# Patient Record
Sex: FEMALE | Race: WHITE | Employment: FULL TIME | URBAN - METROPOLITAN AREA
[De-identification: names, ages, dates, MRNs, and addresses within clinical notes are randomized per-mention and may not be internally consistent; named-entity substitution may affect disease eponyms.]

---

## 2018-10-31 ENCOUNTER — OFFICE VISIT (OUTPATIENT)
Dept: INFECTIOUS DISEASES | Age: 23
End: 2018-10-31
Payer: COMMERCIAL

## 2018-10-31 VITALS
OXYGEN SATURATION: 99 % | WEIGHT: 231 LBS | HEART RATE: 87 BPM | BODY MASS INDEX: 38.49 KG/M2 | HEIGHT: 65 IN | SYSTOLIC BLOOD PRESSURE: 130 MMHG | DIASTOLIC BLOOD PRESSURE: 80 MMHG

## 2018-10-31 DIAGNOSIS — B00.9 HERPES SIMPLEX TYPE 2 INFECTION: ICD-10-CM

## 2018-10-31 DIAGNOSIS — A53.9 SYPHILIS: ICD-10-CM

## 2018-10-31 PROCEDURE — 99204 OFFICE O/P NEW MOD 45 MIN: CPT | Performed by: INTERNAL MEDICINE

## 2018-10-31 RX ORDER — MEDROXYPROGESTERONE ACETATE 10 MG/1
TABLET ORAL
COMMUNITY
Start: 2018-10-19 | End: 2020-06-22 | Stop reason: ALTCHOICE

## 2018-10-31 ASSESSMENT — ENCOUNTER SYMPTOMS
ALLERGIC/IMMUNOLOGIC NEGATIVE: 1
GASTROINTESTINAL NEGATIVE: 1
RESPIRATORY NEGATIVE: 1
EYES NEGATIVE: 1

## 2018-11-01 NOTE — PROGRESS NOTES
10/31/2018         Referring Physician: Alda Smiley DO  Primary Care Physician: No primary care provider on file. Impression/Plan:   Diagnosis Orders   1. Syphilis  TREPONEMA PALLIDUM (SYPHILLIS) ANTIBODY BY TP-PA    Penicillin G Benzathine (BICILLIN L-A) [id number] UNIT/4ML SUSP    C.trachomatis N.gonorrhoeae DNA, Urine    C.trachomatis N.gonorrhoeae DNA    Hepatitis B Surface Antibody    Hepatitis B Surface Antigen    HEPATITIS C ANTIBODY   2. Herpes simplex type 2 infection         Discussion:  Syphilis  Late latent syphilis, Brenda has it as well  RPR 1:64  HIV screen negative  Reports receipt of  Benzathine penicillin G at the health department. Partner also treated  Needs Hep B, Hep C, GC screening  Plan for 2 more doses of penicillin G  We will monitor every 6 months through to 18 months    Herpes simplex type 2 infection  HSV-2 positive  No ulcers at present time. Return in about 4 weeks (around 11/28/2018). History: Tye Bustos is a 24 y.o.  female presenting today With a referral for treatment of syphilis. The patient had seen hot ObGyn for irregular menstrual periods. She requested for a full panel of tests, RPR was 1:64. Uncertain if confirmatory test with done. She was full of by the health department, who proceeded to give both she and her partner intramuscularly penicillin injections. The patient states that she has had 12 sexual partners over her lifetime. He has not used condoms regularly. She recalls having a shallow painless ulcer 2 years ago. She cannot recall having the rash. She denies headaches, weakness, blurred vision, chest pain, abdominal pain, change in urinary habits. She cannot recall having the confirmatory test for syphilis. Review of Systems   Constitutional: Negative. HENT: Negative. Eyes: Negative. Respiratory: Negative. Cardiovascular: Negative. Gastrointestinal: Negative. Allergic/Immunologic: Negative.     Neurological: Negative. Hematological: Negative. Psychiatric/Behavioral: Negative. Allergies   Allergen Reactions    Latex Rash    Nickel Hives and Rash       Patient Active Problem List   Diagnosis    Syphilis    Herpes simplex type 2 infection       Current Outpatient Prescriptions   Medication Sig Dispense Refill    Penicillin G Benzathine (BICILLIN L-A) [id number] UNIT/4ML SUSP Inject 4 mLs into the muscle once a week for 2 doses 8 mL 0    medroxyPROGESTERone (PROVERA) 10 MG tablet        No current facility-administered medications for this visit. History reviewed. No pertinent past medical history. Past Surgical History:   Procedure Laterality Date    TONSILLECTOMY         Social History     Social History    Marital status: Single     Spouse name: N/A    Number of children: N/A    Years of education: N/A     Occupational History    Not on file.      Social History Main Topics    Smoking status: Current Every Day Smoker     Packs/day: 1.00    Smokeless tobacco: Never Used    Alcohol use No    Drug use: Yes     Types: Marijuana    Sexual activity: Yes     Partners: Male     Other Topics Concern    Not on file     Social History Narrative    No narrative on file       Family History   Problem Relation Age of Onset    Breast Cancer Paternal Aunt     Breast Cancer Maternal Grandmother     Breast Cancer Paternal Grandmother        Vital Signs:  Vitals:    10/31/18 0902   BP: 130/80   Site: Right Upper Arm   Position: Sitting   Cuff Size: Large Adult   Pulse: 87   SpO2: 99%   Weight: 231 lb (104.8 kg)   Height: 5' 5\" (1.651 m)        Wt Readings from Last 3 Encounters:   10/31/18 231 lb (104.8 kg)   08/21/17 220 lb (99.8 kg)   01/06/17 210 lb (95.3 kg)        Physical Exam:   Gen: alert and NAD  HEENT: sclera clear, pupils equal and reactive, extra ocular muscles intact, oropharynx clear, mucus membranes moist, tympanic membranes clear bilaterally, no cervical lymphadenopathy noted and neck

## 2020-05-13 ENCOUNTER — OFFICE VISIT (OUTPATIENT)
Dept: INFECTIOUS DISEASES | Age: 25
End: 2020-05-13
Payer: COMMERCIAL

## 2020-05-13 VITALS
WEIGHT: 236.4 LBS | SYSTOLIC BLOOD PRESSURE: 138 MMHG | HEART RATE: 95 BPM | TEMPERATURE: 98.3 F | DIASTOLIC BLOOD PRESSURE: 86 MMHG | BODY MASS INDEX: 39.34 KG/M2

## 2020-05-13 PROCEDURE — 99214 OFFICE O/P EST MOD 30 MIN: CPT | Performed by: INTERNAL MEDICINE

## 2020-05-13 ASSESSMENT — ENCOUNTER SYMPTOMS
EYES NEGATIVE: 1
GASTROINTESTINAL NEGATIVE: 1
RESPIRATORY NEGATIVE: 1
ALLERGIC/IMMUNOLOGIC NEGATIVE: 1

## 2020-05-13 NOTE — ASSESSMENT & PLAN NOTE
Not active. Chronic acyclovir (800 mg bid) or valacyclovir suppression are options. She will let me know.

## 2020-05-13 NOTE — ASSESSMENT & PLAN NOTE
RPR 1:8-1:16, signifies cure. 1:128 likely a lab error. Received a dose of penicillin in April 2020. Would monitor RPR. See in 3 months. If elevated re-evaluate for possible LP if clinically indicated.

## 2020-05-13 NOTE — PROGRESS NOTES
Narrative    Not on file       Family History   Problem Relation Age of Onset    Breast Cancer Paternal Aunt     Breast Cancer Maternal Grandmother     Breast Cancer Paternal Grandmother        Vital Signs:  Vitals:    05/13/20 0929   BP: 138/86   Site: Left Upper Arm   Position: Sitting   Cuff Size: Large Adult   Pulse: 95   Temp: 98.3 °F (36.8 °C)   TempSrc: Tympanic   Weight: 236 lb 6.4 oz (107.2 kg)        Wt Readings from Last 3 Encounters:   05/13/20 236 lb 6.4 oz (107.2 kg)   10/31/18 231 lb (104.8 kg)   08/21/17 220 lb (99.8 kg)        Physical Exam:   Gen: alert and NAD  HEENT: sclera clear, pupils equal and reactive, extra ocular muscles intact, oropharynx clear, mucus membranes moist, tympanic membranes clear bilaterally, no cervical lymphadenopathy noted and neck supple  Neck: supple, no significant adenopathy  Chest: clear to auscultation, no wheezes, rales or rhonchi, symmetric air entry  Heart: regular rate and rhythm, no murmurs  ABD: abdomen is soft without significant tenderness, masses, organomegaly or guarding.   EXT:peripheral pulses normal, no pedal edema, no clubbing or cyanosis  NEURO: alert, oriented, normal speech, no focal findings or movement disorder noted  Skin: well hydrated, no lesions, surgical site examined  Wounds: None  Labs:   WBC   Date Value Ref Range Status   08/21/2017 9.9 4.0 - 10.5 K/CU MM Final     CREATININE   Date Value Ref Range Status   08/21/2017 0.9 0.6 - 1.1 MG/DL Final       Cultures:  No results found for: CULTURE    Imaging Studies:

## 2020-06-22 ENCOUNTER — OFFICE VISIT (OUTPATIENT)
Dept: FAMILY MEDICINE CLINIC | Age: 25
End: 2020-06-22
Payer: COMMERCIAL

## 2020-06-22 VITALS
DIASTOLIC BLOOD PRESSURE: 80 MMHG | HEIGHT: 64 IN | RESPIRATION RATE: 16 BRPM | SYSTOLIC BLOOD PRESSURE: 122 MMHG | HEART RATE: 80 BPM | BODY MASS INDEX: 40.49 KG/M2 | OXYGEN SATURATION: 98 % | TEMPERATURE: 97.5 F | WEIGHT: 237.2 LBS

## 2020-06-22 PROBLEM — Z76.89 ENCOUNTER TO ESTABLISH CARE: Status: ACTIVE | Noted: 2020-06-22

## 2020-06-22 PROBLEM — N91.2 AMENORRHEA: Status: ACTIVE | Noted: 2020-06-22

## 2020-06-22 LAB
CONTROL: NORMAL
PREGNANCY TEST URINE, POC: NEGATIVE

## 2020-06-22 PROCEDURE — G8417 CALC BMI ABV UP PARAM F/U: HCPCS | Performed by: NURSE PRACTITIONER

## 2020-06-22 PROCEDURE — 99203 OFFICE O/P NEW LOW 30 MIN: CPT | Performed by: NURSE PRACTITIONER

## 2020-06-22 PROCEDURE — 81025 URINE PREGNANCY TEST: CPT | Performed by: NURSE PRACTITIONER

## 2020-06-22 PROCEDURE — 4004F PT TOBACCO SCREEN RCVD TLK: CPT | Performed by: NURSE PRACTITIONER

## 2020-06-22 PROCEDURE — G8427 DOCREV CUR MEDS BY ELIG CLIN: HCPCS | Performed by: NURSE PRACTITIONER

## 2020-06-22 ASSESSMENT — PATIENT HEALTH QUESTIONNAIRE - PHQ9
1. LITTLE INTEREST OR PLEASURE IN DOING THINGS: 0
SUM OF ALL RESPONSES TO PHQ QUESTIONS 1-9: 0
SUM OF ALL RESPONSES TO PHQ9 QUESTIONS 1 & 2: 0
SUM OF ALL RESPONSES TO PHQ QUESTIONS 1-9: 0
2. FEELING DOWN, DEPRESSED OR HOPELESS: 0

## 2020-06-22 ASSESSMENT — ENCOUNTER SYMPTOMS: RESPIRATORY NEGATIVE: 1

## 2020-06-22 NOTE — PROGRESS NOTES
Objective:      /80 (Site: Right Upper Arm, Position: Sitting, Cuff Size: Medium Adult)   Pulse 80   Temp 97.5 °F (36.4 °C) (Temporal)   Resp 16   Ht 5' 4\" (1.626 m)   Wt 237 lb 3.2 oz (107.6 kg)   LMP 04/30/2020   SpO2 98%   BMI 40.72 kg/m²      Physical Exam  Vitals signs reviewed. Constitutional:       Appearance: Normal appearance. She is obese. She is not ill-appearing. HENT:      Mouth/Throat:      Mouth: Mucous membranes are moist.      Pharynx: Oropharynx is clear. Eyes:      Extraocular Movements: Extraocular movements intact. Conjunctiva/sclera: Conjunctivae normal.      Pupils: Pupils are equal, round, and reactive to light. Neck:      Musculoskeletal: Normal range of motion and neck supple. Thyroid: No thyroid mass, thyromegaly or thyroid tenderness. Trachea: Trachea normal.   Cardiovascular:      Rate and Rhythm: Normal rate and regular rhythm. Heart sounds: Normal heart sounds. Pulmonary:      Effort: Pulmonary effort is normal.      Breath sounds: Normal breath sounds. Abdominal:      General: Bowel sounds are normal.      Palpations: Abdomen is soft. Tenderness: There is no abdominal tenderness. Musculoskeletal: Normal range of motion. Skin:     General: Skin is warm and dry. Capillary Refill: Capillary refill takes less than 2 seconds. Neurological:      General: No focal deficit present. Mental Status: She is alert and oriented to person, place, and time. Deep Tendon Reflexes: Reflexes are normal and symmetric. Psychiatric:         Attention and Perception: Attention normal.         Mood and Affect: Mood normal.         Behavior: Behavior normal.            Assessment / Plan:      1. Encounter to establish care  Welcome to my practice, I look forward to helping you meet your health goals. Please let me know how I can help you. 2. Amenorrhea  Will request records from Dr. Mellisa Loaiza and Dr. Rosa Maria Cardoso.   Recommend that patient

## 2020-06-22 NOTE — LETTER
[facility] [address] [place] [id number]  Phone: [phone]  Fax: [phone]    JEREMY Verdugo CNP        June 22, 2020     Patient: Kim Encarnacion   YOB: 1995   Date of Visit: 6/22/2020       To Whom it May Concern:    Kim Encarnacion was seen in my clinic on 6/22/2020. She may return to work on 06/23/2020. If you have any questions or concerns, please don't hesitate to call.     Sincerely,       JEREMY Verdugo CNP

## 2021-03-10 ENCOUNTER — HOSPITAL ENCOUNTER (OUTPATIENT)
Dept: MRI IMAGING | Age: 26
Discharge: HOME | End: 2021-03-10
Payer: COMMERCIAL

## 2021-03-10 DIAGNOSIS — H46.12 OPTIC NEURITIS, RETROBULBAR (ACUTE), LEFT: ICD-10-CM

## 2021-03-10 PROCEDURE — A9579 GAD-BASE MR CONTRAST NOS,1ML: HCPCS | Performed by: SPECIALIST

## 2021-03-10 PROCEDURE — 6360000004 HC RX CONTRAST MEDICATION: Performed by: SPECIALIST

## 2021-03-10 PROCEDURE — 70543 MRI ORBT/FAC/NCK W/O &W/DYE: CPT

## 2021-03-10 PROCEDURE — 70553 MRI BRAIN STEM W/O & W/DYE: CPT

## 2021-03-10 RX ADMIN — GADOTERIDOL 20 ML: 279.3 INJECTION, SOLUTION INTRAVENOUS at 12:10

## 2021-03-12 ENCOUNTER — HOSPITAL ENCOUNTER (EMERGENCY)
Age: 26
Discharge: HOME | DRG: 082 | End: 2021-03-12
Admitting: PHYSICIAN ASSISTANT
Payer: COMMERCIAL

## 2021-03-12 VITALS
HEART RATE: 79 BPM | HEIGHT: 64 IN | OXYGEN SATURATION: 100 % | WEIGHT: 240 LBS | DIASTOLIC BLOOD PRESSURE: 77 MMHG | TEMPERATURE: 97.8 F | BODY MASS INDEX: 40.97 KG/M2 | SYSTOLIC BLOOD PRESSURE: 122 MMHG | RESPIRATION RATE: 19 BRPM

## 2021-03-12 DIAGNOSIS — H46.9 OPTIC NEURITIS: Primary | ICD-10-CM

## 2021-03-12 PROCEDURE — 99285 EMERGENCY DEPT VISIT HI MDM: CPT

## 2021-03-12 PROCEDURE — 96365 THER/PROPH/DIAG IV INF INIT: CPT

## 2021-03-12 PROCEDURE — 80048 BASIC METABOLIC PNL TOTAL CA: CPT

## 2021-03-12 PROCEDURE — 2580000003 HC RX 258: Performed by: NURSE PRACTITIONER

## 2021-03-12 PROCEDURE — 6360000002 HC RX W HCPCS: Performed by: NURSE PRACTITIONER

## 2021-03-12 RX ADMIN — METHYLPREDNISOLONE SODIUM SUCCINATE 1000 MG: 1 INJECTION, POWDER, LYOPHILIZED, FOR SOLUTION INTRAMUSCULAR; INTRAVENOUS at 22:17

## 2021-03-12 ASSESSMENT — PAIN SCALES - GENERAL: PAINLEVEL_OUTOF10: 2

## 2021-03-12 NOTE — Clinical Note
The patient has decided to leave against medical advice, because \"its the weekend\"  She has normal mental status and adequate capacity to make medical decisions. The patient refuses hospital admission and wants to be discharged. The risks have  been explained to the patient, including worsening illness, chronic pain, permanent disability, and death. The benefits of admission have also been explained, including the availability and proximity of nurses, physicians, monitoring, diagnostic t esting, and treatment. The patient was able to understand and state the risks and benefits of hospital admission. This was witnessed by nurse  and me. She had the opportunity to ask questions about her medical condition. The patient was merna ated to the extent that she would allow, and knows that she may return for care at any time. Follow up has been discussed and arranged with YANIRA.  Sari Lucas

## 2021-03-13 NOTE — PROGRESS NOTES
I have been consulted on this patient this evening from the ER. Outpatient ophthalmology called me to consult regarding the patient  Tells me patient comes to him with eye pain and visual field defect. Outpatient MRI performed and acute optic neuritis is found  This is her second episode in her lifetime of optic neuritis  Patient was told my opthamology to go to the ER this morning around [phone]. Patient refused and demanded oral steroids  Opthamology instructed and educated this was not appropriate treatment. I instructed this was not appropriate treatment, in addition to the need for further work up. Patient refused to come to ER around 1000 this morning as she did not want to stay in the hospital over the weekend. I instructed opthalmology that if she would come in at the time of instructed I may be able to have case management work on outpatient infusion over the weekend. Opthalmology called me back around [phone] and instructed me that patient found neurologist in [place] to give her oral steroids. I then received a page at [phone] stating patient was in the ER. I instructed PA that she would need 3 days of IV steroids and that she would need to stay in the hospital for this as now it would not be possible to set up outpatient home care infusions as this service is difficult to provide over the weekend. I spoke to patient on the phone in the ER and she states she will refuse care and not stay over the weekend as it would be detrimental to her mental health. I educated her on the risk of optic neuritis and vision loss and additionally in regards to a demyelination work up. She continued to refuse. I have instructed her I will place the order for her to receive her first dose of steroids tonight and that I will place additional orders for next two doses and If she left that would be against medical advice.

## 2021-03-13 NOTE — ED NOTES
Pt. reviewed discharge instructions and follow up instructions. s. Pt. verbalizes understanding with no further questions. Pt. ambulatory and not showing any signs of distress. IV removed.        Al Smith RN  03/12/21 2172

## 2021-03-13 NOTE — ED PROVIDER NOTES
Triage Chief Complaint:   Eye Pain and Headache    Nenana:  Today in the ED I had the pleasure of caring for Saurabh Kim who is a 31 y.o. female that presents today to the ED for L sided eye pain and blurred vision. She has been having sx x 1 year but got worse 1 week ago. Seen ophthamologist 2 days ago and had some edema within the eye. Had a MRI, showed new lesion with optic neuritis. She was advised by neurology to come to the ED for evalutaiton. Pt has mild associated headache.      ROS:  REVIEW OF SYSTEMS    At least 10 systems reviewed      All other review of systems are negative  See HPI and nursing notes for additional information       Past Medical History:   Diagnosis Date    Chlamydia     Herpes simplex type 2 (HSV-2) infection affecting pregnancy, antepartum     History of PCOS     Syphilis      Past Surgical History:   Procedure Laterality Date    TONSILLECTOMY      WISDOM TOOTH EXTRACTION       Family History   Problem Relation Age of Onset    Breast Cancer Paternal Aunt     Breast Cancer Paternal Grandmother     No Known Problems Mother     No Known Problems Father     Cancer Paternal Grandfather      Social History     Socioeconomic History    Marital status: Single     Spouse name: Not on file    Number of children: Not on file    Years of education: Not on file    Highest education level: Not on file   Occupational History    Not on file   Social Needs    Financial resource strain: Not on file    Food insecurity     Worry: Not on file     Inability: Not on file    Transportation needs     Medical: Not on file     Non-medical: Not on file   Tobacco Use    Smoking status: Current Every Day Smoker     Packs/day: 1.50     Years: 12.00     Pack years: 18.00    Smokeless tobacco: Never Used   Substance and Sexual Activity    Alcohol use: No    Drug use: Not Currently     Types: Marijuana    Sexual activity: Yes     Partners: Male   Lifestyle    Physical activity     Days per week: Not on file     Minutes per session: Not on file    Stress: Not on file   Relationships    Social connections     Talks on phone: Not on file     Gets together: Not on file     Attends Alevism service: Not on file     Active member of club or organization: Not on file     Attends meetings of clubs or organizations: Not on file     Relationship status: Not on file    Intimate partner violence     Fear of current or ex partner: Not on file     Emotionally abused: Not on file     Physically abused: Not on file     Forced sexual activity: Not on file   Other Topics Concern    Not on file   Social History Narrative    Not on file     Current Facility-Administered Medications   Medication Dose Route Frequency Provider Last Rate Last Admin    methylPREDNISolone sodium (SOLU-MEDROL) 1,000 mg in sodium chloride 0.9 % 250 mL IVPB  1,000 mg Intravenous Daily JenniferJEREMY Herbert -  mL/hr at 03/12/21 2217 1,000 mg at 03/12/21 2217     Current Outpatient Medications   Medication Sig Dispense Refill    VITAMIN D PO Take by mouth       Allergies   Allergen Reactions    Latex Rash    Iodine Swelling    Nickel Hives and Rash       Nursing Notes Reviewed    Physical Exam:  ED Triage Vitals   Enc Vitals Group      BP 03/12/21 2005 (!) 114/59      Pulse 03/12/21 2005 79      Resp 03/12/21 2005 19      Temp 03/12/21 2005 97.9 °F (36.6 °C)      Temp Source 03/12/21 2005 Oral      SpO2 03/12/21 2005 99 %      Weight 03/12/21 2026 240 lb (108.9 kg)      Height 03/12/21 2026 5' 4\" (1.626 m)      Head Circumference --       Peak Flow --       Pain Score --       Pain Loc --       Pain Edu? --       Excl. in [address]? --      General :Patient is awake alert oriented person place and time no acute distress nontoxic appearing  HEENT: Pupils are equally round and reactive to light extraocular motors are intact conjunctivae clear sclerae white there is no injection no icterus. Nose without any rhinorrhea or epistaxis. Lungs: Respiratory rations unlabored. Chest wall: There is no tenderness to palpation over the chest wall or over ribs  Abdomen: Abdomen is nondistended. Musculoskeletal: 5 out of 5 strength in all 4 extremities full flexion extension abduction and adduction supination pronation of all extremities and all digits. No obvious muscle atrophy is noted. No focal muscle deficits are appreciated  Dermatology: Skin is warm and dry there is no obvious abscesses lacerations or lesions noted  Psych: Mentation is grossly normal cognition is grossly normal. Affect is appropriate  . I have reviewed and interpreted all of the currently available lab results from this visit (if applicable):  No results found for this visit on 03/12/21. Radiographs (if obtained):  [] The following radiograph was interpreted by myself in the absence of a radiologist:   [] Radiologist's Report Reviewed:  No orders to display       EKG (if obtained):   Please See Note of attending physician for EKG interpretation.      Chart review shows recent radiograph(s):  Mri Orbits Face Neck W Wo Contrast    Result Date: 3/10/2021  EXAMINATION: MRI OF THE BRAIN WITHOUT AND WITH CONTRAST; MRI OF THE BRAIN AND MRI OF THE ORBITS WITH AND WITHOUT CONTRAST 3/10/2021 12:05 pm TECHNIQUE: Multiplanar multisequence MRI of the head/brain was performed without and with the administration of intravenous contrast.; Multiplanar multisequence MRI of the brain and MRI of the orbits was performed with and without intravenous contrast. COMPARISON: 01/06/2017 HISTORY: ORDERING SYSTEM PROVIDED HISTORY: Optic neuritis, retrobulbar (acute), left TECHNOLOGIST PROVIDED HISTORY: Is the patient pregnant?->No Reason for Exam: pt has optic neuritis lt eye acute hx of rt optic neuritis 20 ml prohance; ORDERING SYSTEM PROVIDED HISTORY: Optic neuritis, retrobulbar (acute), left TECHNOLOGIST PROVIDED HISTORY: Area(s) of interest?->ORBIT Is the patient pregnant?->No Reason for Exam: optic neuritis lt 20 ml prohance FINDINGS: MRI BRAIN: INTRACRANIAL STRUCTURES/VENTRICLES: There are no areas of restricted diffusion to suggest an acute infarct. The cerebral and cerebellar parenchyma demonstrate normal volume for age. There is a focal white matter lesion noted in the posterior left periventricular white matter, radiating perpendicularly from the ependymal surface of the lateral ventricle which may represent a focal area of demyelination given the patient's clinical history. No other areas of abnormal signal are noted in the cerebral or cerebellar parenchyma. There are no abnormal extra-axial fluid collections. The ventricles are normal in size. Normal major intracranial flow voids are noted. There are no areas of abnormal enhancement in the cerebral or cerebellar parenchyma. There are no areas of blooming artifact noted on the gradient echo sequences to suggest sequela of acute or chronic hemorrhage. SINUSES: The paranasal sinuses are well-aerated. The mastoid air cells are well aerated. BONES/SOFT TISSUES: The bone marrow signal intensity is normal in appearance. Craniocervical junction is unremarkable. Pituitary gland is unremarkable. MRI ORBITS: There is an old left lamina papyracea fracture. The globes are normal in appearance. There is enhancement and edema along the left optic nerve sheath complex, compatible with optic neuritis. The extraocular muscles are unremarkable. The enhancement extends along the left optic canal.  The optic chiasm is unremarkable. Infundibulum is midline. The cavernous sinuses are unremarkable. 1. Findings compatible with left optic neuritis. 2. Focal white matter lesion in the posterior left periventricular white matter, compatible with demyelinating disease given the patient's clinical history. No areas of abnormal enhancement intracranially.      Mri Brain W Wo Contrast    Result Date: 3/10/2021  EXAMINATION: MRI OF THE BRAIN WITHOUT AND WITH CONTRAST; MRI OF THE BRAIN AND MRI OF THE ORBITS WITH AND WITHOUT CONTRAST 3/10/2021 12:05 pm TECHNIQUE: Multiplanar multisequence MRI of the head/brain was performed without and with the administration of intravenous contrast.; Multiplanar multisequence MRI of the brain and MRI of the orbits was performed with and without intravenous contrast. COMPARISON: 01/06/2017 HISTORY: ORDERING SYSTEM PROVIDED HISTORY: Optic neuritis, retrobulbar (acute), left TECHNOLOGIST PROVIDED HISTORY: Is the patient pregnant?->No Reason for Exam: pt has optic neuritis lt eye acute hx of rt optic neuritis 20 ml prohance; ORDERING SYSTEM PROVIDED HISTORY: Optic neuritis, retrobulbar (acute), left TECHNOLOGIST PROVIDED HISTORY: Area(s) of interest?->ORBIT Is the patient pregnant?->No Reason for Exam: optic neuritis lt 20 ml prohance FINDINGS: MRI BRAIN: INTRACRANIAL STRUCTURES/VENTRICLES: There are no areas of restricted diffusion to suggest an acute infarct. The cerebral and cerebellar parenchyma demonstrate normal volume for age. There is a focal white matter lesion noted in the posterior left periventricular white matter, radiating perpendicularly from the ependymal surface of the lateral ventricle which may represent a focal area of demyelination given the patient's clinical history. No other areas of abnormal signal are noted in the cerebral or cerebellar parenchyma. There are no abnormal extra-axial fluid collections. The ventricles are normal in size. Normal major intracranial flow voids are noted. There are no areas of abnormal enhancement in the cerebral or cerebellar parenchyma. There are no areas of blooming artifact noted on the gradient echo sequences to suggest sequela of acute or chronic hemorrhage. SINUSES: The paranasal sinuses are well-aerated. The mastoid air cells are well aerated. BONES/SOFT TISSUES: The bone marrow signal intensity is normal in appearance. Craniocervical junction is unremarkable.  Pituitary gland is unremarkable. MRI ORBITS: There is an old left lamina papyracea fracture. The globes are normal in appearance. There is enhancement and edema along the left optic nerve sheath complex, compatible with optic neuritis. The extraocular muscles are unremarkable. The enhancement extends along the left optic canal.  The optic chiasm is unremarkable. Infundibulum is midline. The cavernous sinuses are unremarkable. 1. Findings compatible with left optic neuritis. 2. Focal white matter lesion in the posterior left periventricular white matter, compatible with demyelinating disease given the patient's clinical history. No areas of abnormal enhancement intracranially. MDM:     Interventions given this visit:   Orders Placed This Encounter   Medications    methylPREDNISolone sodium (SOLU-MEDROL) 1,000 mg in sodium chloride 0.9 % 250 mL IVPB       Patient presents today to the emergency department. For IV infusion of steroids after being diagnosed with optic neuritis. Recommendation of both specialist ophthalmologist and neurologist is that patient be  AGAINST MEDICAL ADVICE. admitted to the hospital for IV steroid infusion. However patient believes Radha Sotelo is ridiculous\" because it is the weekend and she does not want to stay in the hospital over the weekend. I did advise patient that if both of her specialists believe that she will require immediate intravenous medications in order to potentially preserve her site that that would be my recommendation however once again she refuses. Jamari Peña the neurological [facility] did speak the patient over the phone. However patient persistently refuses admission to the hospital.  Will provide single dose of IV steroids here in the ED. And discharge patient is educated on outpatient follow-up        I independently managed patient today in the ED.       /66   Pulse 83   Temp 97.9 °F (36.6 °C) (Oral)   Resp 18   Ht 5' 4\" (1.626 m)   Wt 240 lb (108.9 kg) SpO2 96%   BMI 41.20 kg/m²       Clinical Impression:  1. Optic neuritis        Disposition referral (if applicable):  Sacha Shin      as needed    Disposition medications (if applicable):  New Prescriptions    No medications on file         Comment: Please note this report has been produced using speech recognition software and may contain errors related to that system including errors in grammar, punctuation, and spelling, as well as words and phrases that may be inappropriate. If there are any questions or concerns please feel free to contact the dictating provider for clarification.       Harpal Avina, [address] [place] [address]  03/12/21 2523

## 2021-03-13 NOTE — ED TRIAGE NOTES
Dr. Hector Pappas advised/recommended PT to come to Er for steroid infusion and be seen by neurologist on call due to PT new onset of symptoms of blurred vision, eye pain, headache. PT was diagnosed with 'optic-neuritis\"  Today. PT had MRI on Wednesday and Dr. Hector Pappas called this PT today with the results.

## 2021-03-15 ENCOUNTER — HOSPITAL ENCOUNTER (INPATIENT)
Age: 26
LOS: 1 days | Discharge: HOME | DRG: 082 | End: 2021-03-16
Attending: EMERGENCY MEDICINE | Admitting: HOSPITALIST
Payer: COMMERCIAL

## 2021-03-15 DIAGNOSIS — H46.9 OPTIC NEURITIS: Primary | ICD-10-CM

## 2021-03-15 LAB
ALBUMIN SERPL-MCNC: 3.5 GM/DL (ref 3.4–5)
ALP BLD-CCNC: 52 IU/L (ref 40–129)
ALT SERPL-CCNC: 22 U/L (ref 10–40)
ANION GAP SERPL CALCULATED.3IONS-SCNC: 8 MMOL/L (ref 4–16)
AST SERPL-CCNC: 14 IU/L (ref 15–37)
BASOPHILS ABSOLUTE: 0 K/CU MM
BASOPHILS RELATIVE PERCENT: 0.2 % (ref 0–1)
BILIRUB SERPL-MCNC: 0.1 MG/DL (ref 0–1)
BUN BLDV-MCNC: 16 MG/DL (ref 6–23)
CALCIUM SERPL-MCNC: 8.3 MG/DL (ref 8.3–10.6)
CHLORIDE BLD-SCNC: 106 MMOL/L (ref 99–110)
CO2: 23 MMOL/L (ref 21–32)
CREAT SERPL-MCNC: 0.9 MG/DL (ref 0.6–1.1)
DIFFERENTIAL TYPE: ABNORMAL
EOSINOPHILS ABSOLUTE: 0.2 K/CU MM
EOSINOPHILS RELATIVE PERCENT: 2.2 % (ref 0–3)
GFR AFRICAN AMERICAN: >60 ML/MIN/1.73M2
GFR NON-AFRICAN AMERICAN: >60 ML/MIN/1.73M2
GLUCOSE BLD-MCNC: 86 MG/DL (ref 70–99)
HCT VFR BLD CALC: 41.8 % (ref 37–47)
HEMOGLOBIN: 13.9 GM/DL (ref 12.5–16)
IMMATURE NEUTROPHIL %: 0.6 % (ref 0–0.43)
LYMPHOCYTES ABSOLUTE: 4.5 K/CU MM
LYMPHOCYTES RELATIVE PERCENT: 44.2 % (ref 24–44)
MCH RBC QN AUTO: 32.7 PG (ref 27–31)
MCHC RBC AUTO-ENTMCNC: 33.3 % (ref 32–36)
MCV RBC AUTO: 98.4 FL (ref 78–100)
MONOCYTES ABSOLUTE: 0.7 K/CU MM
MONOCYTES RELATIVE PERCENT: 6.5 % (ref 0–4)
NUCLEATED RBC %: 0 %
PDW BLD-RTO: 12.8 % (ref 11.7–14.9)
PLATELET # BLD: 253 K/CU MM (ref 140–440)
PMV BLD AUTO: 9.8 FL (ref 7.5–11.1)
POTASSIUM SERPL-SCNC: 3.9 MMOL/L (ref 3.5–5.1)
RBC # BLD: 4.25 M/CU MM (ref 4.2–5.4)
SEGMENTED NEUTROPHILS ABSOLUTE COUNT: 4.7 K/CU MM
SEGMENTED NEUTROPHILS RELATIVE PERCENT: 46.3 % (ref 36–66)
SODIUM BLD-SCNC: 137 MMOL/L (ref 135–145)
TOTAL IMMATURE NEUTOROPHIL: 0.06 K/CU MM
TOTAL NUCLEATED RBC: 0 K/CU MM
TOTAL PROTEIN: 6.1 GM/DL (ref 6.4–8.2)
WBC # BLD: 10.1 K/CU MM (ref 4–10.5)

## 2021-03-15 PROCEDURE — 80053 COMPREHEN METABOLIC PANEL: CPT

## 2021-03-15 PROCEDURE — 96366 THER/PROPH/DIAG IV INF ADDON: CPT

## 2021-03-15 PROCEDURE — 99284 EMERGENCY DEPT VISIT MOD MDM: CPT

## 2021-03-15 PROCEDURE — 96365 THER/PROPH/DIAG IV INF INIT: CPT

## 2021-03-15 PROCEDURE — 85025 COMPLETE CBC W/AUTO DIFF WBC: CPT

## 2021-03-15 PROCEDURE — 2580000003 HC RX 258: Performed by: PHYSICIAN ASSISTANT

## 2021-03-15 PROCEDURE — 94761 N-INVAS EAR/PLS OXIMETRY MLT: CPT

## 2021-03-15 PROCEDURE — 1200000000 HC SEMI PRIVATE

## 2021-03-15 PROCEDURE — 2580000003 HC RX 258: Performed by: NURSE PRACTITIONER

## 2021-03-15 PROCEDURE — 6360000002 HC RX W HCPCS: Performed by: PHYSICIAN ASSISTANT

## 2021-03-15 RX ORDER — ACETAMINOPHEN 650 MG/1
650 SUPPOSITORY RECTAL EVERY 6 HOURS PRN
Status: DISCONTINUED | OUTPATIENT
Start: 2021-03-15 | End: 2021-03-16 | Stop reason: HOSPADM

## 2021-03-15 RX ORDER — SODIUM CHLORIDE 0.9 % (FLUSH) 0.9 %
10 SYRINGE (ML) INJECTION PRN
Status: DISCONTINUED | OUTPATIENT
Start: 2021-03-15 | End: 2021-03-16 | Stop reason: HOSPADM

## 2021-03-15 RX ORDER — PROMETHAZINE HYDROCHLORIDE 25 MG/1
12.5 TABLET ORAL EVERY 6 HOURS PRN
Status: DISCONTINUED | OUTPATIENT
Start: 2021-03-15 | End: 2021-03-16 | Stop reason: HOSPADM

## 2021-03-15 RX ORDER — POLYETHYLENE GLYCOL 3350 17 G/17G
17 POWDER, FOR SOLUTION ORAL DAILY PRN
Status: DISCONTINUED | OUTPATIENT
Start: 2021-03-15 | End: 2021-03-16 | Stop reason: HOSPADM

## 2021-03-15 RX ORDER — ONDANSETRON 2 MG/ML
4 INJECTION INTRAMUSCULAR; INTRAVENOUS EVERY 6 HOURS PRN
Status: DISCONTINUED | OUTPATIENT
Start: 2021-03-15 | End: 2021-03-16 | Stop reason: HOSPADM

## 2021-03-15 RX ORDER — TRAMADOL HYDROCHLORIDE 50 MG/1
25 TABLET ORAL EVERY 6 HOURS PRN
Status: CANCELLED | OUTPATIENT
Start: 2021-03-15

## 2021-03-15 RX ORDER — NICOTINE 21 MG/24HR
1 PATCH, TRANSDERMAL 24 HOURS TRANSDERMAL DAILY
Status: DISCONTINUED | OUTPATIENT
Start: 2021-03-15 | End: 2021-03-16 | Stop reason: HOSPADM

## 2021-03-15 RX ORDER — SODIUM CHLORIDE 0.9 % (FLUSH) 0.9 %
10 SYRINGE (ML) INJECTION EVERY 12 HOURS SCHEDULED
Status: DISCONTINUED | OUTPATIENT
Start: 2021-03-15 | End: 2021-03-16 | Stop reason: HOSPADM

## 2021-03-15 RX ORDER — ACETAMINOPHEN 325 MG/1
650 TABLET ORAL EVERY 6 HOURS PRN
Status: DISCONTINUED | OUTPATIENT
Start: 2021-03-15 | End: 2021-03-16 | Stop reason: HOSPADM

## 2021-03-15 RX ADMIN — SODIUM CHLORIDE, PRESERVATIVE FREE 10 ML: 5 INJECTION INTRAVENOUS at 20:04

## 2021-03-15 RX ADMIN — SODIUM CHLORIDE 1000 MG: 9 INJECTION, SOLUTION INTRAVENOUS at 10:22

## 2021-03-15 ASSESSMENT — PAIN SCALES - GENERAL: PAINLEVEL_OUTOF10: 0

## 2021-03-15 NOTE — CARE COORDINATION
CM received consult on patient. Kennedi Morales reported that following to CM:     3/12/2021 Patient was in hospital for: Today in the ED I had the pleasure of caring for Paulina Joy who is a 31 y.o. female that presents today to the ED for L sided eye pain and blurred vision. She has been having sx x 1 year but got worse 1 week ago. Seen ophthamologist 2 days ago and had some edema within the eye. Had a MRI, showed new lesion with optic neuritis. She was advised by neurology to come to the ED for evalutaiton. Pt has mild associated headache. Patient was advised that she needed to be admitted, but patient left AMA. 3/15/2021: 31 y.o. female who was brought in by self for chief complaint of optic neuritis steroid infusion. Patient reports that she saw ophthalmologist Dr. Rhiannon Agrawal for vision problems, had MRI performed which diagnosed her with optic neuritis and possible demyelination lateral to the left ventricle. She was sent here for IV antibiotics, was seen on  and had single dose of methylprednisolone 1000 mg given, was recommended for admission but left AGAINST MEDICAL ADVICE. She returns today     Kennedi Morales requested that case management assist with the followin. Patient needs admitted   2. Patient here the other day and left AMA and then came back   3. Patient has [employer] insurance   4. Erick Pyle has talked to [facility] to see patient--ON Call--Neurology, but not today -- will write order--- Erick Pyle spoke to another doctor thats on call for Neurology (Dr. Zach Oakley doctor reported that Problem with [employer] paying for treatment according to On-call doctor for today and on-call doctor reports that he is out of network--but Mary[phone] will see patient one time.)   5. If patient refuses admission:   Case Management needs to:   1. Set up 4 straight days of infusion treatment with our clinic   2. Set up Neurology appointment that ACCEPTS [employer]   3.  Call Patients primary care

## 2021-03-15 NOTE — ED PROVIDER NOTES
Patient Identification  Karime Junior is a 31 y.o. female    Chief Complaint  Other (sent by neurology for steriod infusion )      HPI  (History provided by patient)  This is a 31 y.o. female who was brought in by self for chief complaint of optic neuritis steroid infusion. Patient reports that she saw ophthalmologist Dr. Chaim Hardwick for vision problems, had MRI performed which diagnosed her with optic neuritis and possible demyelination lateral to the left ventricle. She was sent here for IV antibiotics, was seen on March 12 and had single dose of methylprednisolone 1000 mg given, was recommended for admission but left AMA. She returns today for repeat steroid infusion. States vision in left eye has improved but still has blurring, continues to have left eye pain. Denies any current numbness or weakness, states she had some numbness in her right hand and forearm a few weeks ago that resolved. No difficulty speaking or swallowing. No double vision. No dizziness or difficulty with coordination. REVIEW OF SYSTEMS    Constitutional:  Denies fever, chills  HENT:  Denies sore throat or ear pain   Eyes+ vision changes, eye pain  Cardiovascular:  Denies chest pain, syncope  Respiratory:  Denies shortness of breath, cough   GI:  Denies abdominal pain, nausea, vomiting  :  Denies dysuria, discharge  Musculoskeletal:  Denies back pain, joint pain  Skin:  Denies rash, pruritis  Neurologic:  Denies headache, focal weakness, or sensory changes     See HPI and nursing notes for additional information     I have reviewed the following nursing documentation:  Allergies: Allergies   Allergen Reactions    Latex Rash    Iodine Swelling    Nickel Hives and Rash       Past medical history:  has a past medical history of Chlamydia, Herpes simplex type 2 (HSV-2) infection affecting pregnancy, antepartum, History of PCOS, and Syphilis.     Past surgical history:  has a past surgical history that includes Tonsillectomy and [place] tooth extraction. Home medications:   Prior to Admission medications    Medication Sig Start Date End Date Taking? Authorizing Provider   VITAMIN D PO Take by mouth    Historical Provider, MD       Social history:  reports that she has been smoking. She has a 18.00 pack-year smoking history. She has never used smokeless tobacco. She reports previous drug use. Drug: Marijuana. She reports that she does not drink alcohol. Family history:    Family History   Problem Relation Age of Onset    Breast Cancer Paternal Aunt     Breast Cancer Paternal Grandmother     No Known Problems Mother     No Known Problems Father     Cancer Paternal Grandfather          Exam  /78   Pulse 75   Temp 98.5 °F (36.9 °C) (Oral)   Resp 16   Ht 5' 5\" (1.651 m)   Wt 240 lb (108.9 kg)   LMP 02/18/2021   SpO2 98%   BMI 39.94 kg/m²   Nursing note and vitals reviewed. Constitutional: Well developed, well nourished. No acute distress. HENT:      Head: Normocephalic and atraumatic. Ears: External ears normal.      Nose: Nose normal.     Mouth: Membrane mucosa moist and pink. No posterior oropharynx erythema or tonsillar edema  Eyes: Anicteric sclera. No discharge, PERRL  Neck: Supple. Trachea midline. Cardiovascular: RRR, no murmurs, rubs, or gallops, radial pulses 2+ bilaterally. Pulmonary/Chest: Effort normal. No respiratory distress. CTAB. No stridor. No wheezes. No rales. Abdominal: Soft. Nontender to palpation. No distension. No guarding, rebound tenderness, or evidence of ascites. : No CVA tenderness. Musculoskeletal: Moves all extremities. No gross deformity. NEUROLOGICAL: Awake and alert. GCS 15. Cranial nerves 2-12 grossly intact. Strength 5/5 throughout. Light touch sensation intact throughout. Finger to nose WNL. Skin: Warm and dry. No rash. Psychiatric: Normal mood and affect.  Behavior is normal.      Radiographs (if obtained):  [] The following radiograph was interpreted by myself in the absence of a radiologist:   [x] Radiologist's Report Reviewed:  No orders to display      Mri Orbits Face Neck W Wo Contrast    Result Date: 3/10/2021  EXAMINATION: MRI OF THE BRAIN WITHOUT AND WITH CONTRAST; MRI OF THE BRAIN AND MRI OF THE ORBITS WITH AND WITHOUT CONTRAST 3/10/2021 12:05 pm TECHNIQUE: Multiplanar multisequence MRI of the head/brain was performed without and with the administration of intravenous contrast.; Multiplanar multisequence MRI of the brain and MRI of the orbits was performed with and without intravenous contrast. COMPARISON: 01/06/2017 HISTORY: ORDERING SYSTEM PROVIDED HISTORY: Optic neuritis, retrobulbar (acute), left TECHNOLOGIST PROVIDED HISTORY: Is the patient pregnant?->No Reason for Exam: pt has optic neuritis lt eye acute hx of rt optic neuritis 20 ml prohance; ORDERING SYSTEM PROVIDED HISTORY: Optic neuritis, retrobulbar (acute), left TECHNOLOGIST PROVIDED HISTORY: Area(s) of interest?->ORBIT Is the patient pregnant?->No Reason for Exam: optic neuritis lt 20 ml prohance FINDINGS: MRI BRAIN: INTRACRANIAL STRUCTURES/VENTRICLES: There are no areas of restricted diffusion to suggest an acute infarct. The cerebral and cerebellar parenchyma demonstrate normal volume for age. There is a focal white matter lesion noted in the posterior left periventricular white matter, radiating perpendicularly from the ependymal surface of the lateral ventricle which may represent a focal area of demyelination given the patient's clinical history. No other areas of abnormal signal are noted in the cerebral or cerebellar parenchyma. There are no abnormal extra-axial fluid collections. The ventricles are normal in size. Normal major intracranial flow voids are noted. There are no areas of abnormal enhancement in the cerebral or cerebellar parenchyma. There are no areas of blooming artifact noted on the gradient echo sequences to suggest sequela of acute or chronic hemorrhage.  SINUSES: The paranasal sinuses are well-aerated. The mastoid air cells are well aerated. BONES/SOFT TISSUES: The bone marrow signal intensity is normal in appearance. Craniocervical junction is unremarkable. Pituitary gland is unremarkable. MRI ORBITS: There is an old left lamina papyracea fracture. The globes are normal in appearance. There is enhancement and edema along the left optic nerve sheath complex, compatible with optic neuritis. The extraocular muscles are unremarkable. The enhancement extends along the left optic canal.  The optic chiasm is unremarkable. Infundibulum is midline. The cavernous sinuses are unremarkable. 1. Findings compatible with left optic neuritis. 2. Focal white matter lesion in the posterior left periventricular white matter, compatible with demyelinating disease given the patient's clinical history. No areas of abnormal enhancement intracranially. Mri Brain W Wo Contrast    Result Date: 3/10/2021  EXAMINATION: MRI OF THE BRAIN WITHOUT AND WITH CONTRAST; MRI OF THE BRAIN AND MRI OF THE ORBITS WITH AND WITHOUT CONTRAST 3/10/2021 12:05 pm TECHNIQUE: Multiplanar multisequence MRI of the head/brain was performed without and with the administration of intravenous contrast.; Multiplanar multisequence MRI of the brain and MRI of the orbits was performed with and without intravenous contrast. COMPARISON: 01/06/2017 HISTORY: ORDERING SYSTEM PROVIDED HISTORY: Optic neuritis, retrobulbar (acute), left TECHNOLOGIST PROVIDED HISTORY: Is the patient pregnant?->No Reason for Exam: pt has optic neuritis lt eye acute hx of rt optic neuritis 20 ml prohance; ORDERING SYSTEM PROVIDED HISTORY: Optic neuritis, retrobulbar (acute), left TECHNOLOGIST PROVIDED HISTORY: Area(s) of interest?->ORBIT Is the patient pregnant?->No Reason for Exam: optic neuritis lt 20 ml prohance FINDINGS: MRI BRAIN: INTRACRANIAL STRUCTURES/VENTRICLES: There are no areas of restricted diffusion to suggest an acute infarct.   The cerebral and cerebellar parenchyma demonstrate normal volume for age. There is a focal white matter lesion noted in the posterior left periventricular white matter, radiating perpendicularly from the ependymal surface of the lateral ventricle which may represent a focal area of demyelination given the patient's clinical history. No other areas of abnormal signal are noted in the cerebral or cerebellar parenchyma. There are no abnormal extra-axial fluid collections. The ventricles are normal in size. Normal major intracranial flow voids are noted. There are no areas of abnormal enhancement in the cerebral or cerebellar parenchyma. There are no areas of blooming artifact noted on the gradient echo sequences to suggest sequela of acute or chronic hemorrhage. SINUSES: The paranasal sinuses are well-aerated. The mastoid air cells are well aerated. BONES/SOFT TISSUES: The bone marrow signal intensity is normal in appearance. Craniocervical junction is unremarkable. Pituitary gland is unremarkable. MRI ORBITS: There is an old left lamina papyracea fracture. The globes are normal in appearance. There is enhancement and edema along the left optic nerve sheath complex, compatible with optic neuritis. The extraocular muscles are unremarkable. The enhancement extends along the left optic canal.  The optic chiasm is unremarkable. Infundibulum is midline. The cavernous sinuses are unremarkable. 1. Findings compatible with left optic neuritis. 2. Focal white matter lesion in the posterior left periventricular white matter, compatible with demyelinating disease given the patient's clinical history. No areas of abnormal enhancement intracranially.        Labs  Results for orders placed or performed during the hospital encounter of 03/15/21   CBC Auto Differential   Result Value Ref Range    WBC 10.1 4.0 - 10.5 K/CU MM    RBC 4.25 4.2 - 5.4 M/CU MM    Hemoglobin 13.9 12.5 - 16.0 GM/DL    Hematocrit 41.8 37 - 47 %    MCV 98.4 78 - 100 FL    MCH 32.7 (H) 27 - 31 PG    MCHC 33.3 32.0 - 36.0 %    RDW 12.8 11.7 - 14.9 %    Platelets 053 756 - 135 K/CU MM    MPV 9.8 7.5 - 11.1 FL    Differential Type AUTOMATED DIFFERENTIAL     Segs Relative 46.3 36 - 66 %    Lymphocytes % 44.2 (H) 24 - 44 %    Monocytes % 6.5 (H) 0 - 4 %    Eosinophils % 2.2 0 - 3 %    Basophils % 0.2 0 - 1 %    Segs Absolute 4.7 K/CU MM    Lymphocytes Absolute 4.5 K/CU MM    Monocytes Absolute 0.7 K/CU MM    Eosinophils Absolute 0.2 K/CU MM    Basophils Absolute 0.0 K/CU MM    Nucleated RBC % 0.0 %    Total Nucleated RBC 0.0 K/CU MM    Total Immature Neutrophil 0.06 K/CU MM    Immature Neutrophil % 0.6 (H) 0 - 0.43 %   CMP   Result Value Ref Range    Sodium 137 135 - 145 MMOL/L    Potassium 3.9 3.5 - 5.1 MMOL/L    Chloride 106 99 - 110 mMol/L    CO2 23 21 - 32 MMOL/L    BUN 16 6 - 23 MG/DL    CREATININE 0.9 0.6 - 1.1 MG/DL    Glucose 86 70 - 99 MG/DL    Calcium 8.3 8.3 - 10.6 MG/DL    Albumin 3.5 3.4 - 5.0 GM/DL    Total Protein 6.1 (L) 6.4 - 8.2 GM/DL    Total Bilirubin 0.1 0.0 - 1.0 MG/DL    ALT 22 10 - 40 U/L    AST 14 (L) 15 - 37 IU/L    Alkaline Phosphatase 52 40 - 129 IU/L    GFR Non-African American >60 >60 mL/min/1.73m2    GFR African American >60 >60 mL/min/1.73m2    Anion Gap 8 4 - 16         MDM  Patient presents for continued blurred vision, diagnosed with optic neuritis 5 days ago, was recommended to stay here during previous ED stay 3 days ago and declined despite discussion regarding site threatening condition. Patient returned today for more steroids, after extensive discussion with both myself and Dr. Clarke Zelayao she is now agreeable to stay in hospital, I did speak with Dr. Anaya Bound with neurology who recommended 5 days of 1000 mg Solu-Medrol, first dose given in ED. Consult placed to Nimo ALFONSO who agreed to admit to hospitalist service. This patient was also seen and evaluated by Dr. Clarke Mcdowell. Final Impression  1.  Optic neuritis        Blood pressure 124/78, pulse 75, temperature 98.5 °F (36.9 °C), temperature source Oral, resp. rate 16, height 5' 5\" (1.651 m), weight 240 lb (108.9 kg), last menstrual period 02/18/2021, SpO2 98 %. Disposition:  Admit to med/surg floor in stable condition. Patient was given scripts for the following medications. I counseled patient how to take these medications. New Prescriptions    No medications on file       This chart was generated using the [address] [place] [address] dictation system. I created this record but it may contain dictation errors given the limitations of this technology.        Arely Pan PA-C  03/15/21 [id number]

## 2021-03-15 NOTE — H&P
History and Physical  JEREMY rOtiz-BC   Internal Medicine Hospitalist      Name:  Arthuro Severance /Age/Sex: 1995  (31 y.o. female)   MRN & CSN:  [id number] & [id number] Admission Date/Time: 3/15/2021  9:08 AM   Location:  ED28/ED-28 PCP: Jodi Meeks, [address] [facility] Day: 1      Supervising Physician: Dr. Simran Savage     Chief Complaint: Other (sent by neurology for steriod infusion )     Assessment and Plan:   Arthuro Severance is a 31 y.o. morbidly obese female who presents with Optic neuritis, left      Left eye pain/blurred vision secondary to optic neuritis       -was seen in the ED last 21 for Lt eye pain with blurred vision, was diagnosed with optic neuritis       -pt supposed to be admitted for IV infusion of steroids for 5 days last Friday       -but refused to stay and left AMA after receiving a single dose of IV steroids       -Neurology, Dr. Maynor Masterson consulted in ED       -consult ophthalmologist, Dr. Gifty Nagy       -received single dose of methylprednisolone 1000 mg in ED       -cont IV methylprednisolone 1000 mg, daily       -control pain          Tobacco abuse - on Nicotine patch, tobacco cessation education.  Morbid obesity - current BMI       -Health maintenance: exercise, lifestyle modification, and reduced caloric intake            Chronic Illnesses:       -chlamydia       -herpes simplex type II       -syphilis      Current diagnosis and plan of management discussed with the patient and family at the time of admission in lay language who agree to the above plan and disposition of admission for further care. All concerns and questions addressed.       Patient assessment and plan in conjunction with supervising physician - Dr. Marny Bumpers   DVT Prophylaxis [x] Lovenox, []  Heparin, [] SCDs, [] Ambulation  [] Long term AC   GI Prophylaxis [] PPI,  [] H2 Blocker,  [] Carafate,  [x] Diet,  [x] No GI prophylaxis, N/A: patient is not under significant medical stress, non-ICU or is receiving a diet/tube feeds   Code Status  Full CODE   Disposition Patient requires continued admission due to Optic neuritis, left. Discharge Plan: Patient plans to return home upon discharge. MDM [] Low, [x] Moderate,[]  High  Patient's risk as above due to:      [x] One or more chronic illnesses with mild exacerbation progression      [] Two or more stable chronic illnesses      [] Undiagnosed new problem with uncertain prognosis      [] Elective major surgery      []Prescription drug management     History of Present Illness:     Principal Problem: Optic neuritis, left  Saurabh Risk is a 31 y.o. morbidly obese female with past medical history of left eye pain/blurred vision secondary to optic neuritis, chlamydia, herpes simplex type II, syphilis, and tobacco abuse was sent to the ED by her ophthalmologist, Dr. Rachel Kelly with her mother for IV steroid infusion/treatment. Patient reports that she came in to the ED last 03/12/21 for left eye pain with blurred vision and was diagnosed with optic neuritis. Stated that last Friday she received a call from Dr. Rachel Kelly asking her to go to the hospital and get admitted be treated with IV steroids. .She was supposed to be admitted for IV infusion of steroids for 5 days. However, she refused to stay and left AMA after received a single dose of IV steroids. Patient agreed to the plan of care, to get admitted for steroids treatment, family member at bedside. Patient received single dose of methylprednisolone 1000 mg in ED. Patient stated that her vision in left eye has improved but still can feel some blurring sensation and pain. She denies chest pain, fever, cough, SOB or difficulty breathing, headache, paresthesias, abdominal pain, nausea, vomiting, changes in bowels, dysuria, and B/L weakness. Upon interview, the patient provided the history as above. ED Course: Discussed case with ED physician prior to admission.     ROS: Ten point ROS reviewed and negative, unless as noted above per HPI. Objective:   No intake or output data in the 24 hours ending 03/15/21 1351     Vitals:   Vitals:    03/15/21 0855 03/15/21 1309   BP: 138/86 124/78   Pulse: 75    Resp: 16    Temp: 98.5 °F (36.9 °C)    TempSrc: Oral    SpO2: 99% 98%   Weight: 240 lb (108.9 kg)    Height: 5' 5\" (1.651 m)      Physical Exam: 03/15/21    GEN  -Awake, alert, appearing morbidly obese female, sitting upright in bed in no apparent distress, cooperative, able to give adequate history. Appears given age. EYES -Pupils are equally round. No vision changes. No scleral erythema, discharge, or conjunctivitis. HENT -MM are moist. Oral pharynx without exudates, no evidence of thrush. NECK -Supple, no apparent thyromegaly or masses. RESP -LS CTA equal bilat, no wheezes, rales or rhonchi. Symmetric chest movement. No respiratory distress noted. C/V  -S1/S2 auscultated. RRR without appreciable M/R/G. No JVD or carotid bruits. Peripheral pulses equal bilaterally and palpable. Peripheral pulses equal bilaterally and palpable. No peripheral edema. GI  -central obesity, abdomen is soft, round, non-distended, no significant tenderness. No masses or guarding. + BS in all quadrants. Rectal exam deferred.   -Sampson catheter is not present. LYMPH  -No palpable cervical lymphadenopathy and no hepatosplenomegaly. No petechiae or ecchymoses. MS  -B/L extremities strong muscles strength. Full movements. No gross joint deformities. No swelling, intact sensation symmetrical.   SKIN  -Normal coloration, warm, dry. No open wounds or ulcers. NEURO  -CN 2-12 appear grossly intact, normal speech, no lateralizing weakness. PSYC  -Awake, alert, oriented x 4. Anxious.     Past Medical History:      Past Medical History:   Diagnosis Date    Chlamydia     Herpes simplex type 2 (HSV-2) infection affecting pregnancy, antepartum     History of PCOS     Syphilis      Past Surgery History:  Patient has a past surgical history that includes Tonsillectomy and [place] tooth extraction. Social History:    FAM HX: Assessed: family history includes Breast Cancer in her paternal aunt and paternal grandmother; Cancer in her paternal grandfather; No Known Problems in her father and mother. Soc HX:   Social History     Socioeconomic History    Marital status: Single     Spouse name: None    Number of children: None    Years of education: None    Highest education level: None   Occupational History    None   Social Needs    Financial resource strain: None    Food insecurity     Worry: None     Inability: None    Transportation needs     Medical: None     Non-medical: None   Tobacco Use    Smoking status: Current Every Day Smoker     Packs/day: 1.50     Years: 12.00     Pack years: 18.00    Smokeless tobacco: Never Used   Substance and Sexual Activity    Alcohol use: No    Drug use: Not Currently     Types: Marijuana    Sexual activity: Yes     Partners: Male   Lifestyle    Physical activity     Days per week: None     Minutes per session: None    Stress: None   Relationships    Social connections     Talks on phone: None     Gets together: None     Attends Confucianist service: None     Active member of club or organization: None     Attends meetings of clubs or organizations: None     Relationship status: None    Intimate partner violence     Fear of current or ex partner: None     Emotionally abused: None     Physically abused: None     Forced sexual activity: None   Other Topics Concern    None   Social History Narrative    None     TOBACCO:   reports that she has been smoking. She has a 18.00 pack-year smoking history. She has never used smokeless tobacco.  ETOH:   reports no history of alcohol use. Drugs:  reports previous drug use. Drug: Marijuana. Allergies:    Allergies   Allergen Reactions    Latex Rash    Iodine Swelling    Nickel Hives and Rash     Medications:   Medications: Infusions:   PRN Meds:   Prior to Admission Meds:  Prior to Admission medications    Medication Sig Start Date End Date Taking? Authorizing Provider   VITAMIN D PO Take by mouth    Historical Provider, MD     Data:     Laboratory this visit:  Reviewed  Recent Labs     03/15/21  0938   WBC 10.1   HGB 13.9   HCT 41.8         Recent Labs     03/15/21  0938      K 3.9      CO2 23   BUN 16   CREATININE 0.9     Recent Labs     03/15/21  0938   AST 14*   ALT 22   BILITOT 0.1   ALKPHOS 52     No results for input(s): INR in the last 72 hours. No results for input(s): CKTOTAL, CKMB, CKMBINDEX in the last 72 hours. Invalid input(s): Suellen Kimball input(s): PRO-BNP    Radiology this visit:  Reviewed. Mri Orbits Face Neck W Wo Contrast    Result Date: 3/10/2021  EXAMINATION: MRI OF THE BRAIN WITHOUT AND WITH CONTRAST; MRI OF THE BRAIN AND MRI OF THE ORBITS WITH AND WITHOUT CONTRAST 3/10/2021 12:05 pm TECHNIQUE: Multiplanar multisequence MRI of the head/brain was performed without and with the administration of intravenous contrast.; Multiplanar multisequence MRI of the brain and MRI of the orbits was performed with and without intravenous contrast. COMPARISON: 01/06/2017 HISTORY: ORDERING SYSTEM PROVIDED HISTORY: Optic neuritis, retrobulbar (acute), left TECHNOLOGIST PROVIDED HISTORY: Is the patient pregnant?->No Reason for Exam: pt has optic neuritis lt eye acute hx of rt optic neuritis 20 ml prohance; ORDERING SYSTEM PROVIDED HISTORY: Optic neuritis, retrobulbar (acute), left TECHNOLOGIST PROVIDED HISTORY: Area(s) of interest?->ORBIT Is the patient pregnant?->No Reason for Exam: optic neuritis lt 20 ml prohance FINDINGS: MRI BRAIN: INTRACRANIAL STRUCTURES/VENTRICLES: There are no areas of restricted diffusion to suggest an acute infarct. The cerebral and cerebellar parenchyma demonstrate normal volume for age.   There is a focal white matter lesion noted in the posterior left periventricular white matter, radiating perpendicularly from the ependymal surface of the lateral ventricle which may represent a focal area of demyelination given the patient's clinical history. No other areas of abnormal signal are noted in the cerebral or cerebellar parenchyma. There are no abnormal extra-axial fluid collections. The ventricles are normal in size. Normal major intracranial flow voids are noted. There are no areas of abnormal enhancement in the cerebral or cerebellar parenchyma. There are no areas of blooming artifact noted on the gradient echo sequences to suggest sequela of acute or chronic hemorrhage. SINUSES: The paranasal sinuses are well-aerated. The mastoid air cells are well aerated. BONES/SOFT TISSUES: The bone marrow signal intensity is normal in appearance. Craniocervical junction is unremarkable. Pituitary gland is unremarkable. MRI ORBITS: There is an old left lamina papyracea fracture. The globes are normal in appearance. There is enhancement and edema along the left optic nerve sheath complex, compatible with optic neuritis. The extraocular muscles are unremarkable. The enhancement extends along the left optic canal.  The optic chiasm is unremarkable. Infundibulum is midline. The cavernous sinuses are unremarkable. 1. Findings compatible with left optic neuritis. 2. Focal white matter lesion in the posterior left periventricular white matter, compatible with demyelinating disease given the patient's clinical history. No areas of abnormal enhancement intracranially.      Mri Brain W Wo Contrast    Result Date: 3/10/2021  EXAMINATION: MRI OF THE BRAIN WITHOUT AND WITH CONTRAST; MRI OF THE BRAIN AND MRI OF THE ORBITS WITH AND WITHOUT CONTRAST 3/10/2021 12:05 pm TECHNIQUE: Multiplanar multisequence MRI of the head/brain was performed without and with the administration of intravenous contrast.; Multiplanar multisequence MRI of the brain and MRI of the orbits was performed with and without intravenous contrast. COMPARISON: 01/06/2017 HISTORY: ORDERING SYSTEM PROVIDED HISTORY: Optic neuritis, retrobulbar (acute), left TECHNOLOGIST PROVIDED HISTORY: Is the patient pregnant?->No Reason for Exam: pt has optic neuritis lt eye acute hx of rt optic neuritis 20 ml prohance; ORDERING SYSTEM PROVIDED HISTORY: Optic neuritis, retrobulbar (acute), left TECHNOLOGIST PROVIDED HISTORY: Area(s) of interest?->ORBIT Is the patient pregnant?->No Reason for Exam: optic neuritis lt 20 ml prohance FINDINGS: MRI BRAIN: INTRACRANIAL STRUCTURES/VENTRICLES: There are no areas of restricted diffusion to suggest an acute infarct. The cerebral and cerebellar parenchyma demonstrate normal volume for age. There is a focal white matter lesion noted in the posterior left periventricular white matter, radiating perpendicularly from the ependymal surface of the lateral ventricle which may represent a focal area of demyelination given the patient's clinical history. No other areas of abnormal signal are noted in the cerebral or cerebellar parenchyma. There are no abnormal extra-axial fluid collections. The ventricles are normal in size. Normal major intracranial flow voids are noted. There are no areas of abnormal enhancement in the cerebral or cerebellar parenchyma. There are no areas of blooming artifact noted on the gradient echo sequences to suggest sequela of acute or chronic hemorrhage. SINUSES: The paranasal sinuses are well-aerated. The mastoid air cells are well aerated. BONES/SOFT TISSUES: The bone marrow signal intensity is normal in appearance. Craniocervical junction is unremarkable. Pituitary gland is unremarkable. MRI ORBITS: There is an old left lamina papyracea fracture. The globes are normal in appearance. There is enhancement and edema along the left optic nerve sheath complex, compatible with optic neuritis. The extraocular muscles are unremarkable.   The enhancement extends along the left optic canal.  The

## 2021-03-15 NOTE — ED PROVIDER NOTES
I independently examined and evaluated Jose R Francis. In brief their history revealed patient is here with blurred vision and diagnosis of optic neuritis diagnosed as an outpatient. Patient was actually in the emergency department this past Friday and declined hospitalization. Patient is present today and is requesting IV steroid administration here in the emergency department and is again very resistant to hospitalization or even transfer. Patient is stable visual changes. Patient reports this is the second time this is occurred. Patient otherwise denies any new numbness or weakness. .  Patient was diagnosed on MRI imaging and was evaluated by ophthalmology prior to ED visits. Their focused exam revealed the patient is afebrile and hemodynamically stable on room air. The patient appears age appropriate, appears well-hydrated, well-nourished. Patient is sitting upright in bed wearing sunglasses. Mucous membranes are moist. Speech is clear. Breathing is unlabored. Skin is dry. Mental status is normal. The patient moves all extremities and is without facial droop. ED course: Pt presents as above. Emergent conditions considered. Presentation prompted initial history and physical.  Some labs were obtained which are without acute process. Chart is reviewed. Neurology is consulted. There is plan for admission for further IV steroid administration and further testing especially given the demyelinating intracranial lesion. Patient is very resistant to staying in the hospital and I had to spend an extended amount of time discussing her likely diagnosis, the need for numerous tests, subspecialty consultations and likely plan moving forward. Patient is quite hostile with me and is quite verbally aggressive requiring multiple attempts at de-escalating. Patient is eventually amendable to staying in the hospital thankfully. Patient is discussed with hospice team and patient is admitted to medicine. Questions sought and answered with the patient and mother. They voice understanding and agree with plan. CRITICAL CARE NOTE:  There was a high probability of clinically significant life-threatening deterioration of the patient's condition requiring my urgent intervention due to optic neuritis and a demyelinating intracranial lesion. Subspecialty consultation, IV steroid administration, chart review including most recent ED visit, extended patient and family counseling of over 20 minutes of my personal time was performed to address this. Total critical care time is AT LEAST 35 minutes. This includes vital sign monitoring, pulse oximetry monitoring, telemetry monitoring, clinical response to the IV medications, reviewing the nursing notes, consultation time, dictation/documentation time, and interpretation of the lab work. This time excludes time spent performing procedures and separately billable procedures and family discussion time. All diagnostic, treatment, and disposition decisions were made by myself in conjunction with the Advanced Practice Provider. For all further details of the patient's emergency department visit, please see the Advanced Practice Provider's documentation.        Chrissy Jackson MD  03/15/21 [id number]

## 2021-03-15 NOTE — PROGRESS NOTES
Diagnosis Date    Chlamydia     Herpes simplex type 2 (HSV-2) infection affecting pregnancy, antepartum     History of PCOS     Syphilis      Past Surgical History:        Procedure Laterality Date    TONSILLECTOMY      WISDOM TOOTH EXTRACTION       Current Medications:   Current Facility-Administered Medications: sodium chloride flush 0.9 % injection 10 mL, 10 mL, Intravenous, 2 times per day  sodium chloride flush 0.9 % injection 10 mL, 10 mL, Intravenous, PRN  enoxaparin (LOVENOX) injection 40 mg, 40 mg, Subcutaneous, Daily  promethazine (PHENERGAN) tablet 12.5 mg, 12.5 mg, Oral, Q6H PRN **OR** ondansetron (ZOFRAN) injection 4 mg, 4 mg, Intravenous, Q6H PRN  polyethylene glycol (GLYCOLAX) packet 17 g, 17 g, Oral, Daily PRN  acetaminophen (TYLENOL) tablet 650 mg, 650 mg, Oral, Q6H PRN **OR** acetaminophen (TYLENOL) suppository 650 mg, 650 mg, Rectal, Q6H PRN  nicotine (NICODERM CQ) 21 MG/24HR 1 patch, 1 patch, Transdermal, Daily  [START ON 3/16/2021] methylPREDNISolone sodium (SOLU-MEDROL) 1,000 mg in sodium chloride 0.9 % 250 mL IVPB, 1,000 mg, Intravenous, Daily  Allergies:  Latex, Iodine, and Nickel    Social History:  TOBACCO:   reports that she has been smoking. She has a 18.00 pack-year smoking history. She has never used smokeless tobacco.  ETOH:   reports no history of alcohol use. DRUGS:   reports previous drug use. Drug: Marijuana.   Family History:       Problem Relation Age of Onset    Breast Cancer Paternal Aunt     Breast Cancer Paternal Grandmother     No Known Problems Mother     No Known Problems Father     Cancer Paternal Grandfather        REVIEW OF SYSTEMS:  CONSTITUTIONAL:  negative  HEENT:  negative  RESPIRATORY:  negative  CARDIOVASCULAR:  negative  GASTROINTESTINAL:  negative  GENITOURINARY:  negative  MUSCULOSKELETAL:  negative  BEHAVIOR/PSYCH:  Negative    ROS neg    Family hx neg    PHYSICAL EXAM  ------------------------  Vitals:  BP (!) 148/95   Pulse 75   Temp 98.3 °F (36.8 °C) (Oral)   Resp 16   Ht 5' 5\" (1.651 m)   Wt 242 lb 9 oz (110 kg)   LMP 02/18/2021   SpO2 98%   BMI 40.36 kg/m²      General:  Awake, alert, oriented X 3. Well developed, well nourished, well groomed. No apparent distress. HEENT:  Normocephalic, atraumatic. Pupils equal, round, reactive to light. No scleral icterus. No conjunctival injection. Normal lips, teeth, and gums. No nasal discharge. Neck:  Supple  Heart:  RRR, no murmurs, gallops, rubs  Lungs:  CTA bilaterally, bilat symmetrical expansion, no wheeze, rales, or rhonchi  Abdomen: Bowel sounds present, soft, nontender, no masses, no organomegaly, no peritoneal signs  Extremities:  No clubbing, cyanosis, or edema  Skin:  Warm and dry, no open lesions or rash  Breast: deferred  Rectal: deferred  Genitalia:  deferred    NEUROLOGICAL EXAM  ---------------------------------    Mental Status Exam:             Alert and oriented times three,follows commands,speech and language intact    Cranial [id number] Intact.         Cranial nerve II           Visual acuity:  normal                 Cranial nerve III           Pupils:  equal, round, reactive to light      Cranial nerves III, IV, VI           Extraocular Movements: intact      Cranial nerve V           Facial sensation:  intact      Cranial nerve VII           Facial strength: intact      Cranial nerve VIII           Hearing:  intact      Cranial nerve IX           Palate:  intact      Cranial nerve XI         Shoulder shrug:  intact      Cranial nerve XII          Tongue movement:  normal    Motor:    Drift:  absent  Motor exam is symmetrical 5 out of 5 all extremities bilaterally  Tone:  normal  Abnormal Movements:  Absent    DTRs-2+ biceps,triceps,brachioradialis,knee jerks and ankle jerks bilaterally symmetrical.  Toes-downgoing bilaterally            Sensory:normal sensation              CBC with Differential:    Lab Results   Component Value Date    WBC 10.1 03/15/2021    RBC 4.25 03/15/2021    HGB 13.9 03/15/2021    HCT 41.8 03/15/2021     03/15/2021    MCV 98.4 03/15/2021    MCH 32.7 03/15/2021    MCHC 33.3 03/15/2021    RDW 12.8 03/15/2021    SEGSPCT 46.3 03/15/2021    LYMPHOPCT 44.2 03/15/2021    MONOPCT 6.5 03/15/2021    BASOPCT 0.2 03/15/2021    MONOSABS 0.7 03/15/2021    LYMPHSABS 4.5 03/15/2021    EOSABS 0.2 03/15/2021    BASOSABS 0.0 03/15/2021    DIFFTYPE AUTOMATED DIFFERENTIAL 03/15/2021     CMP:    Lab Results   Component Value Date     03/15/2021    K 3.9 03/15/2021     03/15/2021    CO2 23 03/15/2021    BUN 16 03/15/2021    CREATININE 0.9 03/15/2021    GFRAA >60 03/15/2021    LABGLOM >60 03/15/2021    GLUCOSE 86 03/15/2021    PROT 6.1 03/15/2021    LABALBU 3.5 03/15/2021    CALCIUM 8.3 03/15/2021    BILITOT 0.1 03/15/2021    ALKPHOS 52 03/15/2021    AST 14 03/15/2021    ALT 22 03/15/2021     BMP:    Lab Results   Component Value Date     03/15/2021    K 3.9 03/15/2021     03/15/2021    CO2 23 03/15/2021    BUN 16 03/15/2021    LABALBU 3.5 03/15/2021    CREATININE 0.9 03/15/2021    CALCIUM 8.3 03/15/2021    GFRAA >60 03/15/2021    LABGLOM >60 03/15/2021    GLUCOSE 86 03/15/2021     PT/INR:  No results found for: PROTIME, INR  PTT:  No results found for: APTT, PTT[APTT  U/A:  No results found for: NITRITE, COLORU, PHUR, LABCAST, WBCUA, RBCUA, MUCUS, TRICHOMONAS, YEAST, BACTERIA, CLARITYU, SPECGRAV, LEUKOCYTESUR, UROBILINOGEN, BILIRUBINUR, BLOODU, GLUCOSEU, AMORPHOUS  TSH:  No results found for: TSH  VITAMIN B12: No components found for: B12  FOLATE:  No results found for: FOLATE  RPR:  No results found for: RPR  HARLAN:  No results found for: ANATITER, HARLAN  Urine Toxicology:  No components found for: IAMMENTA, IBARBIT, IBENZO, ICOCAINE, IMARTHC, IOPIATES, IPHENCYC     IMPRESSION:    Left Optic Neuritis    Left Periventricular demyelinating lesions    MS remitting relapsing    Hx Syphilis and Herpes simplex infection-r/o demyelinating lesions from these conditions-LP    PLAN:    Mri brain with C-as above    Mri Orbit-as above    Mri C spine T spine    B 12 folate TSH jass RPR FTA    LP    IV solumedrol    Discussed dx prognosis meds side effects and above with tp and answered all questions. Pt states she has an appointment with Dr. Miles [facility] Neurologist at [facility] in 1 st week April    As I do not take her Insurance and pt already has an candice with a neurologist I will no be taking care as out pt. Doretha Mays MD  BOARD CERTIFIED-NEUROLOGY.

## 2021-03-15 NOTE — PROGRESS NOTES
Joey first presented to [facility] ED on 03/12/21 for left eye pain/blurred vision after MRI showed optic neuritis. It was recommended that she be admitted  for IV infusion of steroids x5 days. Patient refused and left AMA after received a single dose of IV steroids. Spoke with Leonard Bruno at [facility]. Patient presented back to the ED this morning for continued left eye pain and blurred vision. It was again recommended that she be admitted to the hospital to receive 4 days of remaining IV steroids. She is again refusing admission. She is currently getting the 2nd dose of IV steroids prior to leaving the ED today. A referral for  was placed by the ED provider to see if remaining infusions can be set up through the [address] [place] [address] . Joey will also need referral to Neurology for follow up care. Referral has been placed to the [address] [place] [address] at [facility].

## 2021-03-16 ENCOUNTER — APPOINTMENT (OUTPATIENT)
Dept: MRI IMAGING | Age: 26
DRG: 082 | End: 2021-03-16
Payer: COMMERCIAL

## 2021-03-16 ENCOUNTER — APPOINTMENT (OUTPATIENT)
Dept: GENERAL RADIOLOGY | Age: 26
DRG: 082 | End: 2021-03-16
Payer: COMMERCIAL

## 2021-03-16 VITALS
OXYGEN SATURATION: 98 % | DIASTOLIC BLOOD PRESSURE: 83 MMHG | BODY MASS INDEX: 40.41 KG/M2 | HEIGHT: 65 IN | TEMPERATURE: 98.3 F | RESPIRATION RATE: 15 BRPM | HEART RATE: 71 BPM | WEIGHT: 242.56 LBS | SYSTOLIC BLOOD PRESSURE: 136 MMHG

## 2021-03-16 LAB
FOLATE: 15.3 NG/ML (ref 3.1–17.5)
GLUCOSE, CSF: 86 MG/DL (ref 40–75)
LYMPHS CSF: 52 %
LYMPHS CSF: 55 %
OTHER CELLS CSF: NORMAL CU MM
OTHER CELLS, CSF: ABNORMAL %
OTHER CELLS, CSF: NORMAL %
PROTEIN CSF: 23 MG/DL (ref 15–45)
RBC CSF: 0 CU MM
RBC CSF: 1 CU MM
SEGS, CSF: 2 %
TSH HIGH SENSITIVITY: 0.48 UIU/ML (ref 0.27–4.2)
TUBE #: NORMAL
VITAMIN B-12: 978.5 PG/ML (ref 211–911)
WBC CSF: 2 CU MM (ref 0–5)
WBC CSF: 4 CU MM (ref 0–5)

## 2021-03-16 PROCEDURE — 86593 SYPHILIS TEST NON-TREP QUANT: CPT

## 2021-03-16 PROCEDURE — 6360000002 HC RX W HCPCS: Performed by: NURSE PRACTITIONER

## 2021-03-16 PROCEDURE — 6370000000 HC RX 637 (ALT 250 FOR IP): Performed by: NURSE PRACTITIONER

## 2021-03-16 PROCEDURE — 86789 WEST NILE VIRUS ANTIBODY: CPT

## 2021-03-16 PROCEDURE — 84443 ASSAY THYROID STIM HORMONE: CPT

## 2021-03-16 PROCEDURE — 84157 ASSAY OF PROTEIN OTHER: CPT

## 2021-03-16 PROCEDURE — 72146 MRI CHEST SPINE W/O DYE: CPT

## 2021-03-16 PROCEDURE — 87529 HSV DNA AMP PROBE: CPT

## 2021-03-16 PROCEDURE — 36415 COLL VENOUS BLD VENIPUNCTURE: CPT

## 2021-03-16 PROCEDURE — 86788 WEST NILE VIRUS AB IGM: CPT

## 2021-03-16 PROCEDURE — 86038 ANTINUCLEAR ANTIBODIES: CPT

## 2021-03-16 PROCEDURE — 82607 VITAMIN B-12: CPT

## 2021-03-16 PROCEDURE — 2580000003 HC RX 258: Performed by: NURSE PRACTITIONER

## 2021-03-16 PROCEDURE — 82746 ASSAY OF FOLIC ACID SERUM: CPT

## 2021-03-16 PROCEDURE — 72141 MRI NECK SPINE W/O DYE: CPT

## 2021-03-16 PROCEDURE — 86780 TREPONEMA PALLIDUM: CPT

## 2021-03-16 PROCEDURE — 94761 N-INVAS EAR/PLS OXIMETRY MLT: CPT

## 2021-03-16 PROCEDURE — 82945 GLUCOSE OTHER FLUID: CPT

## 2021-03-16 PROCEDURE — 86592 SYPHILIS TEST NON-TREP QUAL: CPT

## 2021-03-16 PROCEDURE — 89051 BODY FLUID CELL COUNT: CPT

## 2021-03-16 PROCEDURE — 009U3ZX DRAINAGE OF SPINAL CANAL, PERCUTANEOUS APPROACH, DIAGNOSTIC: ICD-10-PCS | Performed by: RADIOLOGY

## 2021-03-16 PROCEDURE — 62328 DX LMBR SPI PNXR W/FLUOR/CT: CPT

## 2021-03-16 RX ORDER — SODIUM CHLORIDE 0.9 % (FLUSH) 0.9 %
10 SYRINGE (ML) INJECTION ONCE
Status: CANCELLED
Start: 2021-03-17 | End: 2021-03-17

## 2021-03-16 RX ORDER — METHYLPREDNISOLONE SODIUM SUCCINATE 40 MG/ML
1000 INJECTION, POWDER, LYOPHILIZED, FOR SOLUTION INTRAMUSCULAR; INTRAVENOUS ONCE
Status: CANCELLED
Start: 2021-03-18 | End: 2021-03-18

## 2021-03-16 RX ADMIN — SODIUM CHLORIDE, PRESERVATIVE FREE 10 ML: 5 INJECTION INTRAVENOUS at 10:53

## 2021-03-16 RX ADMIN — SODIUM CHLORIDE 1000 MG: 9 INJECTION, SOLUTION INTRAVENOUS at 10:53

## 2021-03-16 NOTE — PROGRESS NOTES
Hospitalist Progress Note      Name:  Matthew Soni /Age/Sex: 1995  (31 y.o. female)   MRN & CSN:  [id number] & [id number] Admission Date/Time: 3/15/2021  9:08 AM   Location:  3018/3018- PCP: Julio Mccabe 112 Day: 2    Assessment and Plan:   Matthew Soni is a 31 y.o.  female  who presents with Optic neuritis, left    -Left optic neuritis  -Left periventricular demyelinating lesions  -MS remitting relapsing. MRI orbit and brain reviewed. No demyelinating lesions seen on MRI thoracic and cervical spine. LP done 3/16/2021. HARLAN and syphilis RPR and TP PA pending [she had prior history of syphilis]. TSH,and folate within normal limits. B12 pending. Plan  Continue IV Solu-Medrol 1000 mg daily for another 3 days. Follow-up CSF results. Follow-up pending HARLAN, syphilis serology and B12. Neurology input appreciated. She has outpatient follow-up with a neurologist at [facility] on . Diet DIET GENERAL;   DVT Prophylaxis [] Lovenox, []  Heparin, [] SCDs, [] Ambulation   GI Prophylaxis [] PPI,  [] H2 Blocker,  [] Carafate,  [] Diet/Tube Feeds   Code Status Full Code   Disposition  Home   MDM [] Low, [x] Moderate,[]  High     History of Present Illness:   Patient seen and examined. She reports vision is better today. Today is day 2/5 of her high-dose IV Solu-Medrol infusion. She had LP this morning which he tolerated. She told me that her PCP set up the remainder of IV Solu-Medrol infusion and outpatient outpatient infusion center. I called the infusion center and they said they did not receive any orders. They also informed me that it would need prior authorization which may take 48 to 72 hours. I informed patient that it is best for her to stay in the hospital and complete treatment prior to discharge. She threatened to leave AMA.     Ten point ROS reviewed negative, unless as noted above    Objective:   No intake or output data in the 24 hours ending 03/16/21 1154   Vitals:   Vitals:    03/16/21 1045   BP: 136/83   Pulse: 71   Resp:    Temp: 98.3 °F (36.8 °C)   SpO2: 98%     Physical Exam:   GEN Awake female, sitting upright in bed. RESP Clear to auscultation, no wheezes, rales or rhonchi. Symmetric chest movement while on room air. CARDIO/VASC S1/S2 auscultated. Regular rate without appreciable murmurs. No peripheral edema. GI Abdomen is soft without significant tenderness, masses, or guarding. Bowel sounds are normoactive. MSK No gross joint deformities. SKIN Normal coloration, warm, dry. NEURO normal speech, no lateralizing weakness. PSYCH Awake, alert, oriented x 3. Medications:   Medications:    sodium chloride flush  10 mL Intravenous 2 times per day    nicotine  1 patch Transdermal Daily    methylPREDNISolone  1,000 mg Intravenous Daily      Infusions:   PRN Meds: sodium chloride flush, 10 mL, PRN  promethazine, 12.5 mg, Q6H PRN    Or  ondansetron, 4 mg, Q6H PRN  polyethylene glycol, 17 g, Daily PRN  acetaminophen, 650 mg, Q6H PRN    Or  acetaminophen, 650 mg, Q6H PRN        CBC   Recent Labs     03/15/21  0938   WBC 10.1   HGB 13.9   HCT 41.8         BMP   Recent Labs     03/15/21  0938      K 3.9      CO2 23   BUN 16   CREATININE 0.9       Radiology report reviewed     Electronically signed by Tomy Garcias MD on 3/16/2021 at 11:54 AM     Addendum;    I called back Infusion center and they informed me that the did receive prescription for IV solumedrol 1000 mg daily for 3 days to run from 3/17 to 3/19. They have obtained prior authorization. Patient's appointment is at 8 Am each day. Patient and her mom were also called and updated on the appointment time. Will go ahead and discharge her.      3/16/2021, 2:29 PM

## 2021-03-16 NOTE — DISCHARGE SUMMARY
Discharge Summary    Name:  Kenzie Murray /Age/Sex: 1995  (31 y.o. female)   MRN & CSN:  [id number] & [id number] Admission Date/Time: 3/15/2021  9:08 AM   Attending:  Mili Godinez MD Discharging Physician: Mili Godinez MD     Hospital Course:   Kenzie Murray is a 31 y.o.  female  who presents with Optic neuritis, left     -Left optic neuritis  -Left periventricular demyelinating lesions  -MS remitting relapsing. MRI orbit and brain reviewed. No demyelinating lesions seen on MRI thoracic and cervical spine. LP done 3/16/2021. CSF cell count unremarkable. HARLAN and syphilis RPR and TPPA pending [she has prior history of syphilis]. TSH,and folate within normal limits. B12 pending. She received IV solumedrol 1g daily for 2 days and will receive the remaining 3 doses at the infusion center. She was seen by neurologist while in the hospital. She has outpatient follow-up with a neurologist at [facility] on . Neurologist to f/u pending CSF results,HARLAN, syphilis serology and B12. The patient expressed appropriate understanding of and agreement with the discharge recommendations, medications, and plan. Consults this admission:  IP CONSULT TO NEUROLOGY  IP CONSULT TO PRIMARY CARE PROVIDER  IP CONSULT TO CASE MANAGEMENT  IP CONSULT TO HOSPITALIST  IP CONSULT TO OPHTHALMOLOGY  IP CONSULT TO NEUROLOGY    Discharge Instruction:   Follow up appointments:   Neurologist at [facility] on 21  Infusion center on 3/17 to 3/19 for IV solumedrol infusion. Diet:  regular diet   Activity: activity as tolerated  Disposition: Discharged to:   [x]Home, [][facility], []SNF, []Acute Rehab, []Hospice.   Condition on discharge: Stable    Discharge Medications:      Osorio Deni   Home Medication Instructions JYL:[id number]    Printed on:21 1424   Medication Information                      VITAMIN D PO  Take by mouth                 Objective Findings at Discharge:   /83   Pulse 71   Temp 98.3 °F (36.8 °C) (Oral)   Resp 15   Ht 5' 5\" (1.651 m)   Wt 242 lb 9 oz (110 kg)   LMP 02/18/2021   SpO2 98%   BMI 40.36 kg/m²            PHYSICAL EXAM   GEN    Awake female, sitting upright in bed. RESP  Clear to auscultation, no wheezes, rales or rhonchi. Symmetric chest movement while on room air. CARDIO/VASC           S1/S2 auscultated. Regular rate without appreciable murmurs. No peripheral edema. GI        Abdomen is soft without significant tenderness, masses, or guarding. Bowel sounds are normoactive. MSK    No gross joint deformities. SKIN    Normal coloration, warm, dry. NEURO           normal speech, no lateralizing weakness. PSYCH            Awake, alert, oriented x 3. BMP/CBC  Recent Labs     03/15/21  0938      K 3.9      CO2 23   BUN 16   CREATININE 0.9   WBC 10.1   HCT 41.8          IMAGING:  MRI T spine  1. Patient motion partially limits evaluation. 2. No convincing abnormal cord signal.   3. No significant spinal canal stenosis or neural foraminal narrowing. MRI C spine  1. No convincing abnormal cord signal is seen of the cervical spine. 2. No significant spinal canal stenosis or neural foraminal narrowing. 3. Straightening of the normal cervical lordosis without spondylolisthesis. MRI orbit  Mri Brain  1. Findings compatible with left optic neuritis. 2. Focal white matter lesion in the posterior left periventricular white   matter, compatible with demyelinating disease given the patient's clinical   history.  No areas of abnormal enhancement intracranially.        Discharge Time of 38 minutes    Electronically signed by Mili Godinez MD on 3/16/2021 at 2:29 PM

## 2021-03-17 ENCOUNTER — HOSPITAL ENCOUNTER (OUTPATIENT)
Dept: INFUSION THERAPY | Age: 26
Setting detail: INFUSION SERIES
Discharge: HOME | End: 2021-03-17
Payer: COMMERCIAL

## 2021-03-17 VITALS
DIASTOLIC BLOOD PRESSURE: 85 MMHG | RESPIRATION RATE: 16 BRPM | SYSTOLIC BLOOD PRESSURE: 149 MMHG | HEART RATE: 74 BPM | OXYGEN SATURATION: 97 % | TEMPERATURE: 97.3 F

## 2021-03-17 DIAGNOSIS — H46.9 OPTIC NEURITIS, LEFT: Primary | ICD-10-CM

## 2021-03-17 LAB
ANTI-NUCLEAR ANTIBODY (ANA): NORMAL
HSV BY PCR: NORMAL
RPR TITER: NORMAL
RPR: REACTIVE

## 2021-03-17 PROCEDURE — 6360000002 HC RX W HCPCS: Performed by: FAMILY MEDICINE

## 2021-03-17 PROCEDURE — 96365 THER/PROPH/DIAG IV INF INIT: CPT

## 2021-03-17 PROCEDURE — 2580000003 HC RX 258: Performed by: FAMILY MEDICINE

## 2021-03-17 PROCEDURE — 99211 OFF/OP EST MAY X REQ PHY/QHP: CPT

## 2021-03-17 RX ORDER — METHYLPREDNISOLONE SODIUM SUCCINATE 40 MG/ML
1000 INJECTION, POWDER, LYOPHILIZED, FOR SOLUTION INTRAMUSCULAR; INTRAVENOUS ONCE
Status: DISCONTINUED | OUTPATIENT
Start: 2021-03-18 | End: 2021-03-17 | Stop reason: ALTCHOICE

## 2021-03-17 RX ORDER — METHYLPREDNISOLONE SODIUM SUCCINATE 40 MG/ML
1000 INJECTION, POWDER, LYOPHILIZED, FOR SOLUTION INTRAMUSCULAR; INTRAVENOUS ONCE
Status: CANCELLED
Start: 2021-03-19 | End: 2021-03-19

## 2021-03-17 RX ORDER — SODIUM CHLORIDE 0.9 % (FLUSH) 0.9 %
10 SYRINGE (ML) INJECTION ONCE
Status: COMPLETED | OUTPATIENT
Start: 2021-03-17 | End: 2021-03-17

## 2021-03-17 RX ORDER — SODIUM CHLORIDE 0.9 % (FLUSH) 0.9 %
10 SYRINGE (ML) INJECTION ONCE
Status: CANCELLED
Start: 2021-03-18 | End: 2021-03-18

## 2021-03-17 RX ADMIN — SODIUM CHLORIDE 1000 MG: 9 INJECTION, SOLUTION INTRAVENOUS at 10:16

## 2021-03-17 RX ADMIN — SODIUM CHLORIDE, PRESERVATIVE FREE 10 ML: 5 INJECTION INTRAVENOUS at 10:15

## 2021-03-18 ENCOUNTER — HOSPITAL ENCOUNTER (OUTPATIENT)
Dept: INFUSION THERAPY | Age: 26
Setting detail: INFUSION SERIES
Discharge: HOME | End: 2021-03-18
Payer: COMMERCIAL

## 2021-03-18 VITALS
OXYGEN SATURATION: 97 % | TEMPERATURE: 97.3 F | SYSTOLIC BLOOD PRESSURE: 147 MMHG | DIASTOLIC BLOOD PRESSURE: 93 MMHG | RESPIRATION RATE: 16 BRPM | HEART RATE: 74 BPM

## 2021-03-18 DIAGNOSIS — H46.9 OPTIC NEURITIS, LEFT: Primary | ICD-10-CM

## 2021-03-18 LAB
VDRL CSF SCREEN: NON REACTIVE
WEST NILE IGG, CSF: 0.09 IV
WEST NILE IGM, CSF: 0 IV

## 2021-03-18 PROCEDURE — 2580000003 HC RX 258: Performed by: FAMILY MEDICINE

## 2021-03-18 PROCEDURE — 96365 THER/PROPH/DIAG IV INF INIT: CPT

## 2021-03-18 PROCEDURE — 99211 OFF/OP EST MAY X REQ PHY/QHP: CPT

## 2021-03-18 PROCEDURE — 6360000002 HC RX W HCPCS: Performed by: FAMILY MEDICINE

## 2021-03-18 RX ORDER — SODIUM CHLORIDE 0.9 % (FLUSH) 0.9 %
10 SYRINGE (ML) INJECTION ONCE
Status: CANCELLED
Start: 2021-03-19 | End: 2021-03-19

## 2021-03-18 RX ORDER — METHYLPREDNISOLONE SODIUM SUCCINATE 40 MG/ML
1000 INJECTION, POWDER, LYOPHILIZED, FOR SOLUTION INTRAMUSCULAR; INTRAVENOUS ONCE
Status: CANCELLED
Start: 2021-03-20 | End: 2021-03-20

## 2021-03-18 RX ORDER — METHYLPREDNISOLONE SODIUM SUCCINATE 40 MG/ML
1000 INJECTION, POWDER, LYOPHILIZED, FOR SOLUTION INTRAMUSCULAR; INTRAVENOUS ONCE
Status: DISCONTINUED | OUTPATIENT
Start: 2021-03-19 | End: 2021-03-18 | Stop reason: CLARIF

## 2021-03-18 RX ORDER — SODIUM CHLORIDE 0.9 % (FLUSH) 0.9 %
10 SYRINGE (ML) INJECTION ONCE
Status: COMPLETED | OUTPATIENT
Start: 2021-03-18 | End: 2021-03-18

## 2021-03-18 RX ADMIN — SODIUM CHLORIDE, PRESERVATIVE FREE 10 ML: 5 INJECTION INTRAVENOUS at 11:12

## 2021-03-18 RX ADMIN — SODIUM CHLORIDE 1000 MG: 9 INJECTION, SOLUTION INTRAVENOUS at 11:13

## 2021-03-18 NOTE — PROGRESS NOTES
Prior to discharge, the After Visit Summary Discharge Instructions were reviewed with the patient. She was offered a printed version of the AVS, but declined the offer. Recurrent appointment, pt tolerated infusion well. Pt discharged home. Pt to exit via steady gait.     Orders Placed This Encounter   Medications    DISCONTD: methylPREDNISolone sodium (SOLU-MEDROL) injection 1,000 mg    sodium chloride flush 0.9 % injection 10 mL    methylPREDNISolone sodium (SOLU-MEDROL) 1,000 mg in sodium chloride 0.9 % 250 mL IVPB

## 2021-03-18 NOTE — LETTER
[facility]  [address] [address]  Phone: [phone]         March 18, 2021     Patient: Rachel Boo   YOB: 1995   Date of Visit: 3/18/2021       To Whom It May Concern:    Rachel Boo was seen and treated in our Outpatient Infusion Department on 3/18/2021.      Sincerely,        Shilpi Marte RN        Signature:__________________________________

## 2021-03-19 ENCOUNTER — HOSPITAL ENCOUNTER (OUTPATIENT)
Dept: INFUSION THERAPY | Age: 26
Setting detail: INFUSION SERIES
Discharge: HOME | End: 2021-03-19
Payer: COMMERCIAL

## 2021-03-19 VITALS
HEART RATE: 79 BPM | DIASTOLIC BLOOD PRESSURE: 99 MMHG | TEMPERATURE: 98.6 F | SYSTOLIC BLOOD PRESSURE: 159 MMHG | OXYGEN SATURATION: 98 %

## 2021-03-19 DIAGNOSIS — H46.9 OPTIC NEURITIS, LEFT: Primary | ICD-10-CM

## 2021-03-19 PROCEDURE — 99211 OFF/OP EST MAY X REQ PHY/QHP: CPT

## 2021-03-19 PROCEDURE — 6360000002 HC RX W HCPCS: Performed by: FAMILY MEDICINE

## 2021-03-19 PROCEDURE — 2580000003 HC RX 258: Performed by: FAMILY MEDICINE

## 2021-03-19 PROCEDURE — 96365 THER/PROPH/DIAG IV INF INIT: CPT

## 2021-03-19 RX ORDER — SODIUM CHLORIDE 0.9 % (FLUSH) 0.9 %
10 SYRINGE (ML) INJECTION ONCE
Status: COMPLETED | OUTPATIENT
Start: 2021-03-19 | End: 2021-03-19

## 2021-03-19 RX ORDER — SODIUM CHLORIDE 0.9 % (FLUSH) 0.9 %
10 SYRINGE (ML) INJECTION ONCE
Status: CANCELLED
Start: 2021-03-19 | End: 2021-03-19

## 2021-03-19 RX ADMIN — METHYLPREDNISOLONE SODIUM SUCCINATE: 1 INJECTION, POWDER, LYOPHILIZED, FOR SOLUTION INTRAMUSCULAR; INTRAVENOUS at 10:05

## 2021-03-19 RX ADMIN — SODIUM CHLORIDE, PRESERVATIVE FREE 10 ML: 5 INJECTION INTRAVENOUS at 10:04

## 2021-03-19 NOTE — LETTER
[facility]  [address] [address]  Phone: [phone]         March 19, 2021     Patient: Shazia Beckwith   YOB: 1995   Date of Visit: 3/19/2021       To Whom It May Concern:    Shazia Beckwith was seen and treated in our Outpatient Infusion Department on 3/19/2021.         Sincerely,        Robert Gómez RN        Signature:__________________________________

## 2021-03-19 NOTE — PROGRESS NOTES
Pt taken to room 02 for solumedrol infusion. Pt oriented to room, call light, bed/chair controls, TV, pt voiced understanding. Plan of care explained to pt, pt voiced understanding.

## 2021-03-19 NOTE — PROGRESS NOTES
IV discontinued. DSD applied. Pt tolerated well. Discharged instructions given to pt, pt voiced understanding. Pt discharged via self by ambulatory.

## 2021-03-20 LAB — TREPONEMA PALLIDUM CONFIRMATION: REACTIVE

## 2021-05-17 NOTE — LETTER
[address] [address]  Dept: [phone]  Loc: [address] [place] [id number]           3/17/2021     To Whom it May Concern,    Giuliana Zavaleta was seen in our facility today for treatment. If you have any questions or concerns please feel free to call. Thank you.     Yours truly,    Charlie Lee RN Patient unable to complete

## 2022-02-11 ENCOUNTER — APPOINTMENT (RX ONLY)
Dept: URBAN - METROPOLITAN AREA CLINIC 377 | Facility: CLINIC | Age: 27
Setting detail: DERMATOLOGY
End: 2022-02-11

## 2022-02-11 DIAGNOSIS — D18.0 HEMANGIOMA: ICD-10-CM | Status: STABLE

## 2022-02-11 DIAGNOSIS — D22 MELANOCYTIC NEVI: ICD-10-CM | Status: STABLE

## 2022-02-11 DIAGNOSIS — Z71.89 OTHER SPECIFIED COUNSELING: ICD-10-CM

## 2022-02-11 DIAGNOSIS — L82.1 OTHER SEBORRHEIC KERATOSIS: ICD-10-CM | Status: STABLE

## 2022-02-11 DIAGNOSIS — L91.8 OTHER HYPERTROPHIC DISORDERS OF THE SKIN: ICD-10-CM | Status: STABLE

## 2022-02-11 DIAGNOSIS — B35.3 TINEA PEDIS: ICD-10-CM | Status: INADEQUATELY CONTROLLED

## 2022-02-11 DIAGNOSIS — L81.4 OTHER MELANIN HYPERPIGMENTATION: ICD-10-CM | Status: STABLE

## 2022-02-11 PROBLEM — D18.01 HEMANGIOMA OF SKIN AND SUBCUTANEOUS TISSUE: Status: ACTIVE | Noted: 2022-02-11

## 2022-02-11 PROBLEM — D22.5 MELANOCYTIC NEVI OF TRUNK: Status: ACTIVE | Noted: 2022-02-11

## 2022-02-11 PROBLEM — D48.5 NEOPLASM OF UNCERTAIN BEHAVIOR OF SKIN: Status: ACTIVE | Noted: 2022-02-11

## 2022-02-11 PROCEDURE — ? PRESCRIPTION

## 2022-02-11 PROCEDURE — ? FULL BODY SKIN EXAM

## 2022-02-11 PROCEDURE — ? SUNSCREEN RECOMMENDATIONS

## 2022-02-11 PROCEDURE — 99203 OFFICE O/P NEW LOW 30 MIN: CPT | Mod: 25

## 2022-02-11 PROCEDURE — 11103 TANGNTL BX SKIN EA SEP/ADDL: CPT

## 2022-02-11 PROCEDURE — ? COUNSELING

## 2022-02-11 PROCEDURE — 11102 TANGNTL BX SKIN SINGLE LES: CPT

## 2022-02-11 PROCEDURE — ? BIOPSY BY SHAVE METHOD

## 2022-02-11 RX ORDER — KETOCONAZOLE 20 MG/G
CREAM TOPICAL BID
Qty: 30 | Refills: 4 | Status: ERX | COMMUNITY
Start: 2022-02-11

## 2022-02-11 RX ORDER — NAFTIFINE HYDROCHLORIDE 2 G/100G
GEL TOPICAL
Qty: 60 | Refills: 3 | Status: ERX | COMMUNITY
Start: 2022-02-11

## 2022-02-11 RX ADMIN — NAFTIFINE HYDROCHLORIDE: 2 GEL TOPICAL at 00:00

## 2022-02-11 RX ADMIN — KETOCONAZOLE: 20 CREAM TOPICAL at 00:00

## 2022-02-11 ASSESSMENT — LOCATION DETAILED DESCRIPTION DERM
LOCATION DETAILED: EPIGASTRIC SKIN
LOCATION DETAILED: RIGHT MID-UPPER BACK
LOCATION DETAILED: MID-OCCIPITAL SCALP
LOCATION DETAILED: RIGHT MEDIAL SUPERIOR CHEST
LOCATION DETAILED: LEFT LATERAL SUPERIOR CHEST
LOCATION DETAILED: LEFT DORSAL FOOT
LOCATION DETAILED: LEFT SUPERIOR UPPER BACK
LOCATION DETAILED: RIGHT SUPERIOR UPPER BACK
LOCATION DETAILED: 1ST WEBSPACE RIGHT FOOT
LOCATION DETAILED: LEFT MEDIAL TRAPEZIAL NECK
LOCATION DETAILED: RIGHT INFERIOR ANTERIOR NECK
LOCATION DETAILED: PERIUMBILICAL SKIN

## 2022-02-11 ASSESSMENT — LOCATION SIMPLE DESCRIPTION DERM
LOCATION SIMPLE: POSTERIOR NECK
LOCATION SIMPLE: LEFT FOOT
LOCATION SIMPLE: POSTERIOR SCALP
LOCATION SIMPLE: ABDOMEN
LOCATION SIMPLE: RIGHT ANTERIOR NECK
LOCATION SIMPLE: RIGHT FOOT
LOCATION SIMPLE: CHEST
LOCATION SIMPLE: LEFT UPPER BACK
LOCATION SIMPLE: RIGHT UPPER BACK

## 2022-02-11 ASSESSMENT — LOCATION ZONE DERM
LOCATION ZONE: NECK
LOCATION ZONE: FEET
LOCATION ZONE: TRUNK
LOCATION ZONE: SCALP

## 2022-02-11 ASSESSMENT — SEVERITY ASSESSMENT: SEVERITY: MILD TO MODERATE

## 2022-02-11 NOTE — PROCEDURE: MIPS QUALITY
Quality 226: Preventive Care And Screening: Tobacco Use: Screening And Cessation Intervention: Patient screened for tobacco use and is an ex/non-smoker
Quality 431: Preventive Care And Screening: Unhealthy Alcohol Use - Screening: Patient not identified as an unhealthy alcohol user when screened for unhealthy alcohol use using a systematic screening method
Detail Level: Zone
Quality 130: Documentation Of Current Medications In The Medical Record: Current Medications Documented

## 2022-03-17 ENCOUNTER — TELEPHONE (OUTPATIENT)
Dept: FAMILY MEDICINE CLINIC | Age: 27
End: 2022-03-17

## 2022-03-17 NOTE — TELEPHONE ENCOUNTER
Called patient on 3/17/2022 to remind her she needed to schedule a  F/U appointment. She stated that she had been here a year ago and tried to schedule an appointment a month later and couldn't. She proceeded to say \"your practice is a f%$#@  joke\" and to \"never call me again\".

## 2024-03-14 ENCOUNTER — HOSPITAL ENCOUNTER (EMERGENCY)
Age: 29
Discharge: HOME | End: 2024-03-14
Attending: EMERGENCY MEDICINE
Payer: COMMERCIAL

## 2024-03-14 ENCOUNTER — APPOINTMENT (OUTPATIENT)
Dept: GENERAL RADIOLOGY | Age: 29
End: 2024-03-14
Attending: EMERGENCY MEDICINE
Payer: COMMERCIAL

## 2024-03-14 VITALS
WEIGHT: 260 LBS | BODY MASS INDEX: 43.32 KG/M2 | TEMPERATURE: 98.3 F | HEART RATE: 91 BPM | RESPIRATION RATE: 16 BRPM | OXYGEN SATURATION: 96 % | DIASTOLIC BLOOD PRESSURE: 89 MMHG | SYSTOLIC BLOOD PRESSURE: 156 MMHG | HEIGHT: 65 IN

## 2024-03-14 DIAGNOSIS — S93.602A SPRAIN OF LEFT FOOT, INITIAL ENCOUNTER: ICD-10-CM

## 2024-03-14 DIAGNOSIS — S93.402A SPRAIN OF LEFT ANKLE, UNSPECIFIED LIGAMENT, INITIAL ENCOUNTER: Primary | ICD-10-CM

## 2024-03-14 PROCEDURE — 73610 X-RAY EXAM OF ANKLE: CPT

## 2024-03-14 PROCEDURE — 99283 EMERGENCY DEPT VISIT LOW MDM: CPT

## 2024-03-14 PROCEDURE — 6370000000 HC RX 637 (ALT 250 FOR IP): Performed by: EMERGENCY MEDICINE

## 2024-03-14 PROCEDURE — 73630 X-RAY EXAM OF FOOT: CPT

## 2024-03-14 RX ORDER — ACETAMINOPHEN 500 MG
1000 TABLET ORAL ONCE
Status: COMPLETED | OUTPATIENT
Start: 2024-03-14 | End: 2024-03-14

## 2024-03-14 RX ORDER — NAPROXEN 500 MG/1
500 TABLET ORAL 2 TIMES DAILY
Qty: 60 TABLET | Refills: 0 | Status: SHIPPED | OUTPATIENT
Start: 2024-03-14

## 2024-03-14 RX ORDER — ACETAMINOPHEN 325 MG/1
650 TABLET ORAL EVERY 6 HOURS PRN
Qty: 120 TABLET | Refills: 3 | Status: SHIPPED | OUTPATIENT
Start: 2024-03-14

## 2024-03-14 RX ADMIN — ACETAMINOPHEN 1000 MG: 500 TABLET ORAL at 16:02

## 2024-03-14 ASSESSMENT — ENCOUNTER SYMPTOMS
RESPIRATORY NEGATIVE: 1
ALLERGIC/IMMUNOLOGIC NEGATIVE: 1
GASTROINTESTINAL NEGATIVE: 1
EYES NEGATIVE: 1

## 2024-03-14 ASSESSMENT — PAIN DESCRIPTION - DESCRIPTORS: DESCRIPTORS: ACHING;SHARP

## 2024-03-14 ASSESSMENT — PAIN DESCRIPTION - LOCATION
LOCATION: ANKLE
LOCATION: FOOT

## 2024-03-14 ASSESSMENT — PAIN - FUNCTIONAL ASSESSMENT: PAIN_FUNCTIONAL_ASSESSMENT: 0-10

## 2024-03-14 ASSESSMENT — PAIN DESCRIPTION - ORIENTATION
ORIENTATION: LEFT
ORIENTATION: LEFT

## 2024-03-14 ASSESSMENT — PAIN SCALES - GENERAL
PAINLEVEL_OUTOF10: 4
PAINLEVEL_OUTOF10: 3

## 2024-03-14 NOTE — ED PROVIDER NOTES
[facility]      TRIAGE CHIEF COMPLAINT:   Ankle Injury (Reports falling off porch. Pain to L ankle and bottom of foot. )      [place]:  Swathi Dumont is a 28 y.o. female that presents with complaint of left foot ankle pain.  Patient states prior to arrival she was on her porch when she tripped and fell and she injured her left foot and ankle.  She has pain over the lateral aspect.  She took Motrin without relief.  It hurts to walk on it.  Denies other injuries or questions or concerns.  Denies being pregnant.    REVIEW OF SYSTEMS:  At least 10 systems reviewed and otherwise acutely negative except as in the [place].    Review of Systems   Constitutional: Negative.    HENT: Negative.     Eyes: Negative.    Respiratory: Negative.     Cardiovascular: Negative.    Gastrointestinal: Negative.    Endocrine: Negative.    Genitourinary: Negative.    Musculoskeletal:  Positive for arthralgias, joint swelling and myalgias.   Skin: Negative.    Allergic/Immunologic: Negative.    Neurological: Negative.    Hematological: Negative.    Psychiatric/Behavioral: Negative.     All other systems reviewed and are negative.      Past Medical History:   Diagnosis Date    Chlamydia     Herpes simplex type 2 (HSV-2) infection affecting pregnancy, antepartum     History of PCOS     Syphilis      Past Surgical History:   Procedure Laterality Date    TONSILLECTOMY      WISDOM TOOTH EXTRACTION       Family History   Problem Relation Age of Onset    Breast Cancer Paternal Aunt     Breast Cancer Paternal Grandmother     No Known Problems Mother     No Known Problems Father     Cancer Paternal Grandfather      Social History     Socioeconomic History    Marital status: Single     Spouse name: Not on file    Number of children: Not on file    Years of education: Not on file    Highest education level: Not on file   Occupational History    Not on file   Tobacco Use    Smoking status: Every Day     Current packs/day: 0.50

## 2024-07-18 ENCOUNTER — TRANSCRIBE ORDERS (OUTPATIENT)
Dept: ADMINISTRATIVE | Age: 29
End: 2024-07-18

## 2024-07-18 DIAGNOSIS — G35 MULTIPLE SCLEROSIS (HCC): ICD-10-CM

## 2024-07-18 DIAGNOSIS — H57.11 OCULAR PAIN, RIGHT: Primary | ICD-10-CM

## 2024-07-19 ENCOUNTER — HOSPITAL ENCOUNTER (OUTPATIENT)
Dept: MRI IMAGING | Age: 29
Discharge: HOME | End: 2024-07-19
Attending: SPECIALIST
Payer: COMMERCIAL

## 2024-07-19 DIAGNOSIS — H57.11 OCULAR PAIN, RIGHT: ICD-10-CM

## 2024-07-19 DIAGNOSIS — G35 MULTIPLE SCLEROSIS (HCC): ICD-10-CM

## 2024-07-19 PROCEDURE — 6360000004 HC RX CONTRAST MEDICATION: Performed by: SPECIALIST

## 2024-07-19 PROCEDURE — 70543 MRI ORBT/FAC/NCK W/O &W/DYE: CPT

## 2024-07-19 PROCEDURE — A9579 GAD-BASE MR CONTRAST NOS,1ML: HCPCS | Performed by: SPECIALIST

## 2024-07-19 RX ADMIN — GADOTERIDOL 20 ML: 279.3 INJECTION, SOLUTION INTRAVENOUS at 19:08

## 2024-07-26 ENCOUNTER — HOSPITAL ENCOUNTER (INPATIENT)
Age: 29
LOS: 2 days | Discharge: HOME | DRG: 082 | End: 2024-07-28
Admitting: STUDENT IN AN ORGANIZED HEALTH CARE EDUCATION/TRAINING PROGRAM
Payer: COMMERCIAL

## 2024-07-26 ENCOUNTER — APPOINTMENT (OUTPATIENT)
Dept: MRI IMAGING | Age: 29
DRG: 082 | End: 2024-07-26
Payer: COMMERCIAL

## 2024-07-26 DIAGNOSIS — H46.9 OPTIC NEURITIS, RIGHT: Primary | ICD-10-CM

## 2024-07-26 LAB
ALBUMIN SERPL-MCNC: 4.6 GM/DL (ref 3.4–5)
ALP BLD-CCNC: 70 IU/L (ref 40–128)
ALT SERPL-CCNC: 23 U/L (ref 10–40)
ANION GAP SERPL CALCULATED.3IONS-SCNC: 15 MMOL/L (ref 7–16)
AST SERPL-CCNC: 15 IU/L (ref 15–37)
BASOPHILS ABSOLUTE: 0.1 K/CU MM
BASOPHILS RELATIVE PERCENT: 0.3 % (ref 0–1)
BILIRUB SERPL-MCNC: 0.3 MG/DL (ref 0–1)
BUN SERPL-MCNC: 15 MG/DL (ref 6–23)
CALCIUM SERPL-MCNC: 9.7 MG/DL (ref 8.3–10.6)
CHLORIDE BLD-SCNC: 103 MMOL/L (ref 99–110)
CO2: 21 MMOL/L (ref 21–32)
CREAT SERPL-MCNC: 0.8 MG/DL (ref 0.6–1.1)
CRP SERPL HS-MCNC: 10.8 MG/L
DIFFERENTIAL TYPE: ABNORMAL
EOSINOPHILS ABSOLUTE: 0.1 K/CU MM
EOSINOPHILS RELATIVE PERCENT: 0.5 % (ref 0–3)
GFR, ESTIMATED: >90 ML/MIN/1.73M2
GLUCOSE SERPL-MCNC: 102 MG/DL (ref 70–99)
HCT VFR BLD CALC: 44.3 % (ref 37–47)
HEMOGLOBIN: 14.9 GM/DL (ref 12.5–16)
IMMATURE NEUTROPHIL %: 0.5 % (ref 0–0.43)
LYMPHOCYTES ABSOLUTE: 4.5 K/CU MM
LYMPHOCYTES RELATIVE PERCENT: 22.5 % (ref 24–44)
MCH RBC QN AUTO: 32.3 PG (ref 27–31)
MCHC RBC AUTO-ENTMCNC: 33.6 % (ref 32–36)
MCV RBC AUTO: 96.1 FL (ref 78–100)
MONOCYTES ABSOLUTE: 1 K/CU MM
MONOCYTES RELATIVE PERCENT: 5 % (ref 0–4)
NEUTROPHILS ABSOLUTE: 14.2 K/CU MM
NEUTROPHILS RELATIVE PERCENT: 71.2 % (ref 36–66)
NUCLEATED RBC %: 0 %
PDW BLD-RTO: 12.9 % (ref 11.7–14.9)
PLATELET # BLD: 357 K/CU MM (ref 140–440)
PMV BLD AUTO: 9.7 FL (ref 7.5–11.1)
POTASSIUM SERPL-SCNC: 3.8 MMOL/L (ref 3.5–5.1)
PREGNANCY, SERUM: NEGATIVE
RBC # BLD: 4.61 M/CU MM (ref 4.2–5.4)
SED RATE, AUTOMATED: 30 MM/HR (ref 0–20)
SODIUM BLD-SCNC: 139 MMOL/L (ref 135–145)
TOTAL IMMATURE NEUTOROPHIL: 0.1 K/CU MM
TOTAL NUCLEATED RBC: 0 K/CU MM
TOTAL PROTEIN: 8.8 GM/DL (ref 6.4–8.2)
WBC # BLD: 20 K/CU MM (ref 4–10.5)

## 2024-07-26 PROCEDURE — 86140 C-REACTIVE PROTEIN: CPT

## 2024-07-26 PROCEDURE — 96375 TX/PRO/DX INJ NEW DRUG ADDON: CPT

## 2024-07-26 PROCEDURE — 84703 CHORIONIC GONADOTROPIN ASSAY: CPT

## 2024-07-26 PROCEDURE — 99285 EMERGENCY DEPT VISIT HI MDM: CPT

## 2024-07-26 PROCEDURE — 1200000000 HC SEMI PRIVATE

## 2024-07-26 PROCEDURE — 80053 COMPREHEN METABOLIC PANEL: CPT

## 2024-07-26 PROCEDURE — 2580000003 HC RX 258: Performed by: PHYSICIAN ASSISTANT

## 2024-07-26 PROCEDURE — A9579 GAD-BASE MR CONTRAST NOS,1ML: HCPCS | Performed by: PHYSICIAN ASSISTANT

## 2024-07-26 PROCEDURE — 70543 MRI ORBT/FAC/NCK W/O &W/DYE: CPT

## 2024-07-26 PROCEDURE — 6360000004 HC RX CONTRAST MEDICATION: Performed by: PHYSICIAN ASSISTANT

## 2024-07-26 PROCEDURE — 6370000000 HC RX 637 (ALT 250 FOR IP): Performed by: STUDENT IN AN ORGANIZED HEALTH CARE EDUCATION/TRAINING PROGRAM

## 2024-07-26 PROCEDURE — 85652 RBC SED RATE AUTOMATED: CPT

## 2024-07-26 PROCEDURE — 85025 COMPLETE CBC W/AUTO DIFF WBC: CPT

## 2024-07-26 PROCEDURE — 96374 THER/PROPH/DIAG INJ IV PUSH: CPT

## 2024-07-26 PROCEDURE — 6360000002 HC RX W HCPCS: Performed by: PHYSICIAN ASSISTANT

## 2024-07-26 RX ORDER — AMOXICILLIN AND CLAVULANATE POTASSIUM 875; 125 MG/1; MG/1
1 TABLET, FILM COATED ORAL 2 TIMES DAILY
COMMUNITY

## 2024-07-26 RX ORDER — ACETAMINOPHEN 325 MG/1
650 TABLET ORAL EVERY 6 HOURS PRN
Status: DISCONTINUED | OUTPATIENT
Start: 2024-07-26 | End: 2024-07-28 | Stop reason: HOSPADM

## 2024-07-26 RX ORDER — ONDANSETRON 4 MG/1
4 TABLET, ORALLY DISINTEGRATING ORAL EVERY 8 HOURS PRN
Status: DISCONTINUED | OUTPATIENT
Start: 2024-07-26 | End: 2024-07-28 | Stop reason: HOSPADM

## 2024-07-26 RX ORDER — DIPHENHYDRAMINE HYDROCHLORIDE 50 MG/ML
50 INJECTION INTRAMUSCULAR; INTRAVENOUS ONCE
Status: COMPLETED | OUTPATIENT
Start: 2024-07-26 | End: 2024-07-26

## 2024-07-26 RX ORDER — ACETAMINOPHEN 650 MG/1
650 SUPPOSITORY RECTAL EVERY 6 HOURS PRN
Status: DISCONTINUED | OUTPATIENT
Start: 2024-07-26 | End: 2024-07-28 | Stop reason: HOSPADM

## 2024-07-26 RX ORDER — ENOXAPARIN SODIUM 100 MG/ML
30 INJECTION SUBCUTANEOUS 2 TIMES DAILY
Status: DISCONTINUED | OUTPATIENT
Start: 2024-07-26 | End: 2024-07-28 | Stop reason: HOSPADM

## 2024-07-26 RX ORDER — AMOXICILLIN AND CLAVULANATE POTASSIUM 875; 125 MG/1; MG/1
1 TABLET, FILM COATED ORAL 2 TIMES DAILY
Status: DISCONTINUED | OUTPATIENT
Start: 2024-07-26 | End: 2024-07-28 | Stop reason: HOSPADM

## 2024-07-26 RX ORDER — ONDANSETRON 2 MG/ML
4 INJECTION INTRAMUSCULAR; INTRAVENOUS EVERY 6 HOURS PRN
Status: DISCONTINUED | OUTPATIENT
Start: 2024-07-26 | End: 2024-07-28 | Stop reason: HOSPADM

## 2024-07-26 RX ORDER — LOSARTAN POTASSIUM 25 MG/1
25 TABLET ORAL DAILY
Status: DISCONTINUED | OUTPATIENT
Start: 2024-07-27 | End: 2024-07-28 | Stop reason: HOSPADM

## 2024-07-26 RX ORDER — FAMOTIDINE 20 MG/1
20 TABLET, FILM COATED ORAL 2 TIMES DAILY
Status: DISCONTINUED | OUTPATIENT
Start: 2024-07-26 | End: 2024-07-28 | Stop reason: HOSPADM

## 2024-07-26 RX ORDER — LOSARTAN POTASSIUM 25 MG/1
25 TABLET ORAL DAILY
COMMUNITY

## 2024-07-26 RX ADMIN — DIPHENHYDRAMINE HYDROCHLORIDE 50 MG: 50 INJECTION, SOLUTION INTRAMUSCULAR; INTRAVENOUS at 16:02

## 2024-07-26 RX ADMIN — FAMOTIDINE 20 MG: 20 TABLET ORAL at 23:53

## 2024-07-26 RX ADMIN — GADOTERIDOL 20 ML: 279.3 INJECTION, SOLUTION INTRAVENOUS at 19:27

## 2024-07-26 RX ADMIN — WATER 40 MG: 1 INJECTION INTRAMUSCULAR; INTRAVENOUS; SUBCUTANEOUS at 16:02

## 2024-07-26 RX ADMIN — AMOXICILLIN AND CLAVULANATE POTASSIUM 1 TABLET: 875; 125 TABLET, FILM COATED ORAL at 23:53

## 2024-07-26 ASSESSMENT — VISUAL ACUITY
OD: 20/50
OU: 20/20
OS: 20/30

## 2024-07-26 ASSESSMENT — PAIN DESCRIPTION - LOCATION: LOCATION: HEAD;EYE

## 2024-07-26 ASSESSMENT — LIFESTYLE VARIABLES
HOW MANY STANDARD DRINKS CONTAINING ALCOHOL DO YOU HAVE ON A TYPICAL DAY: PATIENT DOES NOT DRINK
HOW OFTEN DO YOU HAVE A DRINK CONTAINING ALCOHOL: NEVER

## 2024-07-26 ASSESSMENT — PAIN DESCRIPTION - PAIN TYPE: TYPE: ACUTE PAIN

## 2024-07-26 ASSESSMENT — PAIN DESCRIPTION - DESCRIPTORS: DESCRIPTORS: PRESSURE

## 2024-07-26 ASSESSMENT — PAIN DESCRIPTION - ORIENTATION: ORIENTATION: RIGHT

## 2024-07-26 ASSESSMENT — PAIN SCALES - GENERAL: PAINLEVEL_OUTOF10: 5

## 2024-07-26 NOTE — ED PROVIDER NOTES
I personally saw Swathi Dumont and made/approved the management plan and take responsibility for the patient management.    In brief, patient with history of MS and previous episodes of optic neuritis presents with decreased vision loss.  Recently had an MRI, approximately 1 week ago.  Was at [facility] recently and recommended inpatient treatment and repeat MRI because of worsening loss of vision.  She left AMA from [facility] because she did not want to be away from her children.  She does not want to be transferred to outside facility that has Optho neurology.    Focused exam revealed   General- no acute distress, alert, cooperative  Skin -intact, no rashes  Eyes: Extraocular movements intact bilaterally, normal conjunctival, reviewed visual acuity.  Respiratory -no respiratory distress, speaking full sentences  MSK: No acute deformities  Abdomen: Nondistended  Psych: Mood and affect appropriate      ED course:   Patient comes to the emergency department with vision loss as described above.  She has already been seen by Optho neurologist at [facility] and recommended for treatment there with repeat MRI.  I do think given her acute symptoms she would best be served by ophthalmology/neurology specialist, however patient does not want to be transferred.  Given she does not want to be transferred, we did speak with our neurologist to see if they would be comfortable treating her for optic neuritis if MRI did show findings consistent with optic neuritis.  Hadley MENCHACA did speak to neurology who encouraged getting the MRI, will consult inpatient if patient does have findings consistent with optic neuritis.   Patient required pretreatment with Solu-Medrol/Benadryl prior to MRI    Chronic conditions affecting care: MS    Records Reviewed : Source previous record including when she was treated for hours approximately 2 years ago.  Final Impression:  1. Optic neuritis, right      DISPOSITION Admitted 07/26/2024 08:58:47 PM      All diagnostic,

## 2024-07-26 NOTE — ED PROVIDER NOTES
[facility] EMERGENCY DEPARTMENT  EMERGENCY DEPARTMENT ENCOUNTER        Pt Name: Swathi Dumont  MRN: [id number]  Birthdate 1995  Date of evaluation: 7/26/2024  Provider: Hadley Cueva PA-C  PCP: No primary care provider on file.  Note Started: 2:54 PM EDT 7/26/24      KINSEY. I have evaluated this patient.        CHIEF COMPLAINT       Chief Complaint   Patient presents with    Eye Problem     Possible optic neuritis.       HISTORY OF PRESENT ILLNESS: 1 or more Elements     History From: pt    Swathi Dumont is a 29 y.o. female with past medical history of MS, optic neuritis who presents complaining of right eye vision loss for the last week.  Patient states progressively worsening right eye vision loss, similar to prior optic neuritis.  She had MRI a week ago that showed no acute MS flare, calls for optic neuritis.  She went to [facility] ER yesterday and they did CTAs that were negative, recommended labs admission for MRI and she care she is.  She is presenting again today requesting an MRI due to her vision loss.  Denies trauma, redness, pain.  Not currently on any steroids    Nursing Notes were all reviewed and agreed with or any disagreements were addressed in the HPI.    REVIEW OF SYSTEMS :      Review of Systems   All other systems reviewed and are negative.      Positives and Pertinent negatives as per HPI.       PAST MEDICAL HISTORY    has a past medical history of Chlamydia, Herpes simplex type 2 (HSV-2) infection affecting pregnancy, antepartum, History of PCOS, and Syphilis.     SURGICAL HISTORY     Past Surgical History:   Procedure Laterality Date    TONSILLECTOMY      WISDOM TOOTH EXTRACTION         CURRENTMEDICATIONS       Previous Medications    ACETAMINOPHEN (TYLENOL) 325 MG TABLET    Take 2 tablets by mouth every 6 hours as needed for Pain    NAPROXEN (NAPROSYN) 500 MG TABLET    Take 1 tablet by mouth 2 times daily    VITAMIN D PO    Take by mouth  Patient is calling back regarding previous notes about bleeding  - asked to be left a detailed message

## 2024-07-27 ENCOUNTER — APPOINTMENT (OUTPATIENT)
Dept: MRI IMAGING | Age: 29
DRG: 082 | End: 2024-07-27
Payer: COMMERCIAL

## 2024-07-27 LAB — 25(OH)D3 SERPL-MCNC: 29.61 NG/ML

## 2024-07-27 PROCEDURE — 72156 MRI NECK SPINE W/O & W/DYE: CPT

## 2024-07-27 PROCEDURE — 6370000000 HC RX 637 (ALT 250 FOR IP): Performed by: STUDENT IN AN ORGANIZED HEALTH CARE EDUCATION/TRAINING PROGRAM

## 2024-07-27 PROCEDURE — 94761 N-INVAS EAR/PLS OXIMETRY MLT: CPT

## 2024-07-27 PROCEDURE — 6370000000 HC RX 637 (ALT 250 FOR IP): Performed by: INTERNAL MEDICINE

## 2024-07-27 PROCEDURE — 2580000003 HC RX 258: Performed by: PHYSICIAN ASSISTANT

## 2024-07-27 PROCEDURE — 82306 VITAMIN D 25 HYDROXY: CPT

## 2024-07-27 PROCEDURE — 36415 COLL VENOUS BLD VENIPUNCTURE: CPT

## 2024-07-27 PROCEDURE — 6370000000 HC RX 637 (ALT 250 FOR IP): Performed by: FAMILY MEDICINE

## 2024-07-27 PROCEDURE — 6360000002 HC RX W HCPCS: Performed by: PHYSICIAN ASSISTANT

## 2024-07-27 PROCEDURE — 6360000002 HC RX W HCPCS: Performed by: STUDENT IN AN ORGANIZED HEALTH CARE EDUCATION/TRAINING PROGRAM

## 2024-07-27 PROCEDURE — 99223 1ST HOSP IP/OBS HIGH 75: CPT | Performed by: STUDENT IN AN ORGANIZED HEALTH CARE EDUCATION/TRAINING PROGRAM

## 2024-07-27 PROCEDURE — 2580000003 HC RX 258: Performed by: STUDENT IN AN ORGANIZED HEALTH CARE EDUCATION/TRAINING PROGRAM

## 2024-07-27 PROCEDURE — 6360000004 HC RX CONTRAST MEDICATION: Performed by: NURSE PRACTITIONER

## 2024-07-27 PROCEDURE — A9579 GAD-BASE MR CONTRAST NOS,1ML: HCPCS | Performed by: NURSE PRACTITIONER

## 2024-07-27 PROCEDURE — 1200000000 HC SEMI PRIVATE

## 2024-07-27 RX ORDER — NICOTINE 21 MG/24HR
1 PATCH, TRANSDERMAL 24 HOURS TRANSDERMAL ONCE
Status: COMPLETED | OUTPATIENT
Start: 2024-07-27 | End: 2024-07-28

## 2024-07-27 RX ORDER — NICOTINE 21 MG/24HR
1 PATCH, TRANSDERMAL 24 HOURS TRANSDERMAL DAILY
Status: DISCONTINUED | OUTPATIENT
Start: 2024-07-27 | End: 2024-07-28 | Stop reason: HOSPADM

## 2024-07-27 RX ADMIN — METHYLPREDNISOLONE SODIUM SUCCINATE 1000 MG: 1 INJECTION INTRAMUSCULAR; INTRAVENOUS at 14:17

## 2024-07-27 RX ADMIN — GADOTERIDOL 20 ML: 279.3 INJECTION, SOLUTION INTRAVENOUS at 17:48

## 2024-07-27 RX ADMIN — AMOXICILLIN AND CLAVULANATE POTASSIUM 1 TABLET: 875; 125 TABLET, FILM COATED ORAL at 08:55

## 2024-07-27 RX ADMIN — FAMOTIDINE 20 MG: 20 TABLET ORAL at 20:39

## 2024-07-27 RX ADMIN — ACETAMINOPHEN 650 MG: 325 TABLET ORAL at 20:39

## 2024-07-27 RX ADMIN — LOSARTAN POTASSIUM 25 MG: 25 TABLET, FILM COATED ORAL at 08:55

## 2024-07-27 RX ADMIN — AMOXICILLIN AND CLAVULANATE POTASSIUM 1 TABLET: 875; 125 TABLET, FILM COATED ORAL at 20:39

## 2024-07-27 RX ADMIN — ENOXAPARIN SODIUM 30 MG: 100 INJECTION SUBCUTANEOUS at 08:55

## 2024-07-27 RX ADMIN — SODIUM CHLORIDE 1000 MG: 9 INJECTION, SOLUTION INTRAVENOUS at 00:08

## 2024-07-27 RX ADMIN — FAMOTIDINE 20 MG: 20 TABLET ORAL at 08:55

## 2024-07-27 ASSESSMENT — PAIN DESCRIPTION - ONSET: ONSET: GRADUAL

## 2024-07-27 ASSESSMENT — PAIN DESCRIPTION - PAIN TYPE: TYPE: ACUTE PAIN

## 2024-07-27 ASSESSMENT — PAIN DESCRIPTION - FREQUENCY: FREQUENCY: INTERMITTENT

## 2024-07-27 ASSESSMENT — PAIN - FUNCTIONAL ASSESSMENT: PAIN_FUNCTIONAL_ASSESSMENT: ACTIVITIES ARE NOT PREVENTED

## 2024-07-27 ASSESSMENT — PAIN SCALES - GENERAL
PAINLEVEL_OUTOF10: 3
PAINLEVEL_OUTOF10: 0

## 2024-07-27 ASSESSMENT — PAIN DESCRIPTION - DESCRIPTORS: DESCRIPTORS: ACHING

## 2024-07-27 ASSESSMENT — PAIN DESCRIPTION - ORIENTATION: ORIENTATION: RIGHT;LEFT

## 2024-07-27 NOTE — H&P
FACE (OUTSIDE IMAGE) July 19, 2024 CLINICAL INDICATIONS: 29 years Female  concern for sinusitis RELEVANT CLINICAL HISTORY: IMAGE GUIDANCE PROTOCOL 1 MM SLICES;;IMAGE GUIDANCE PROTOCOL 1 MM SLICES;;IMAGE GUIDANCE PROTOCOL 1 MM SLICES; TECHNIQUE: A series of transaxial thin section multislice computerized tomographic images are obtained through the level of the paranasal sinuses without IV contrast. Coronal, sagittal and axial reformats are provided. Conventional CT scanner was used. FINDINGS: SINUSES: Frontal sinuses: Well-developed. Clear. Frontal recesses: Patent. Ethmoid sinuses: Well-developed. Remote fracture of the left lamina papyracea. Lamina papyracea: Intact. Sphenoid sinus: Well-developed. Near-complete opacification of the right sphenoid sinus with no air-fluid level. Sphenoethmoidal recesses: Patent left sphenoethmoidal recess with opacification obscuring the right sphenoethmoidal recess. Maxillary sinuses:Well-developed. Clear. Ostiomeatal units: Patent. NASAL CAVITY: Minimal rightward nasal septal deviation. Left weston bullosa. Anterior skull base structures are intact. Visualized portions of the mastoid air cells are well-aerated. Temporomandibular joints appear within normal limits. SOFT TISSUES: Assessment of the intracranial soft tissues is suboptimal secondary to low-dose technique. No significant intracranial or facial soft tissue finding noted at the visualized levels. Full evaluation of the orbits is separately dictated.    IMPRESSION: 1.  Near complete opacification of the right sphenoid sinus which obscures the right sphenoethmoidal recess, which correspond to mucosal thickening of the outside hospital MRI face. No definite air-fluid level to suggest acute sinusitis.  2.  Remote left lamina papyracea fracture. 3.  Remaining paranasal sinuses are clear. I personally viewed and interpreted these images and I have reviewed and approved this report. Electronically Signed By: Sarah Gutpa MD on

## 2024-07-27 NOTE — ED NOTES
ED TO INPATIENT SBAR HANDOFF    Patient Name: Swathi Dumont   :  1995  29 y.o.   Preferred Name    Family/Caregiver Present no   Restraints no   C-SSRS:    Sitter no   Sepsis Risk Score        Situation  Chief Complaint   Patient presents with    Eye Problem     Possible optic neuritis.     Brief Description of Patient's Condition: Pt arrives to ED w/ c/o eye issues and MRI noted optic neuritis  Mental Status: oriented, alert, coherent, logical, thought processes intact, and able to concentrate and follow conversation  Arrived from: home    Imaging:   MRI ORBITS FACE NECK W WO CONTRAST   Final Result   Increased right intraocular optic nerve segments intensity and enhancement as   described above, concerning for acute inflammatory optic neuritis.   Small scattered periventricular subcortical white matter lesions, concerning for   demyelinating disease.            Electronically signed by Aakash Penn        Abnormal labs:   Abnormal Labs Reviewed   CBC WITH AUTO DIFFERENTIAL - Abnormal; Notable for the following components:       Result Value    WBC 20.0 (*)     MCH 32.3 (*)     Neutrophils % 71.2 (*)     Lymphocytes % 22.5 (*)     Monocytes % 5.0 (*)     Immature Neutrophil % 0.5 (*)     All other components within normal limits   COMPREHENSIVE METABOLIC PANEL - Abnormal; Notable for the following components:    Glucose 102 (*)     Total Protein 8.8 (*)     All other components within normal limits   SEDIMENTATION RATE - Abnormal; Notable for the following components:    Sed Rate, Automated 30 (*)     All other components within normal limits   C-REACTIVE PROTEIN - Abnormal; Notable for the following components:    CRP High Sensitivity 10.8 (*)     All other components within normal limits        Background  History:   Past Medical History:   Diagnosis Date    Chlamydia     Herpes simplex type 2 (HSV-2) infection affecting pregnancy, antepartum     History of PCOS     Syphilis

## 2024-07-27 NOTE — CONSULTS
to be an orderable serum test from this hospital.    We will continue to follow.    Greater than 80 minutes total were spent on this patient's case including chart review, reviewing prior and recent imaging, obtaining history, examining the patient, and and documentation.

## 2024-07-28 VITALS
RESPIRATION RATE: 16 BRPM | BODY MASS INDEX: 45.18 KG/M2 | DIASTOLIC BLOOD PRESSURE: 86 MMHG | TEMPERATURE: 97.8 F | SYSTOLIC BLOOD PRESSURE: 143 MMHG | HEIGHT: 65 IN | HEART RATE: 66 BPM | WEIGHT: 271.17 LBS | OXYGEN SATURATION: 98 %

## 2024-07-28 PROCEDURE — 6360000002 HC RX W HCPCS: Performed by: STUDENT IN AN ORGANIZED HEALTH CARE EDUCATION/TRAINING PROGRAM

## 2024-07-28 PROCEDURE — 6370000000 HC RX 637 (ALT 250 FOR IP): Performed by: INTERNAL MEDICINE

## 2024-07-28 PROCEDURE — 6370000000 HC RX 637 (ALT 250 FOR IP): Performed by: STUDENT IN AN ORGANIZED HEALTH CARE EDUCATION/TRAINING PROGRAM

## 2024-07-28 PROCEDURE — 99231 SBSQ HOSP IP/OBS SF/LOW 25: CPT | Performed by: NURSE PRACTITIONER

## 2024-07-28 PROCEDURE — 2580000003 HC RX 258: Performed by: STUDENT IN AN ORGANIZED HEALTH CARE EDUCATION/TRAINING PROGRAM

## 2024-07-28 RX ORDER — METHYLPREDNISOLONE SODIUM SUCCINATE 1 G/16ML
1000 INJECTION, POWDER, LYOPHILIZED, FOR SOLUTION INTRAMUSCULAR; INTRAVENOUS DAILY
Qty: 2 EACH | Refills: 0 | Status: SHIPPED | OUTPATIENT
Start: 2024-07-28

## 2024-07-28 RX ADMIN — AMOXICILLIN AND CLAVULANATE POTASSIUM 1 TABLET: 875; 125 TABLET, FILM COATED ORAL at 08:22

## 2024-07-28 RX ADMIN — METHYLPREDNISOLONE SODIUM SUCCINATE 1000 MG: 1 INJECTION INTRAMUSCULAR; INTRAVENOUS at 10:54

## 2024-07-28 RX ADMIN — LOSARTAN POTASSIUM 25 MG: 25 TABLET, FILM COATED ORAL at 08:22

## 2024-07-28 RX ADMIN — ACETAMINOPHEN 650 MG: 325 TABLET ORAL at 08:22

## 2024-07-28 RX ADMIN — FAMOTIDINE 20 MG: 20 TABLET ORAL at 08:22

## 2024-07-28 ASSESSMENT — PAIN SCALES - GENERAL: PAINLEVEL_OUTOF10: 4

## 2024-07-28 ASSESSMENT — PAIN DESCRIPTION - PAIN TYPE: TYPE: ACUTE PAIN

## 2024-07-28 ASSESSMENT — PAIN DESCRIPTION - LOCATION: LOCATION: HEAD

## 2024-07-28 ASSESSMENT — PAIN DESCRIPTION - DESCRIPTORS: DESCRIPTORS: ACHING

## 2024-07-28 NOTE — DISCHARGE SUMMARY
Discharge Summary    Name:  Swathi Dumont /Age/Sex: 1995  (29 y.o. female)   MRN & CSN:  [id number] & [id number] Admission Date/Time: 2024  1:48 PM   Attending:  Gaby Diaz MD Discharging Physician: Gaby Diaz MD       Discharge Diagnosis:  Right-sided optic neuritis  Multiple sclerosis  Hypertension  Sinusitis  Morbid obesity        Discharge Exam  Physical Exam  Vitals:    24 1930 24 2039 24 0211 24 0800   BP: 124/64  120/76 (!) 143/86   Pulse: 70  63 66   Resp: 17 16 16 16   Temp: 97.9 °F (36.6 °C)  97.7 °F (36.5 °C) 97.8 °F (36.6 °C)   TempSrc: Oral  Oral Oral   SpO2: 98%  98%    Weight:       Height:          General: NAD  Eyes: EOMI  ENT: neck supple  Cardiovascular: Regular rate.  Respiratory: Clear to auscultation  Gastrointestinal: Soft, non tender  Genitourinary: no suprapubic tenderness  Musculoskeletal: No edema  Skin: warm, dry  Neuro: Alert.  Psych: Mood appropriate.     Hospital Course:   Swathi Dumont is a 29 y.o.  female  who presents with Optic neuritis         Right sided optic neuritis   In the setting of uncontrolled multiple sclerosis  MRI orbits reviewed, increased right intraocular optic nerve segments, scattered periventricular subcortical white matter lesions  Pulse dose steroids, Pepcid for stress ulcer prophylaxis  Appreciate neurology recommendation.  Received 3 doses of high-dose IV steroids so far.  Patient to complete treatment at outpatient infusion center as she has no childcare for tomorrow.  Spoke to  and house supervisor.  Prescription for IV steroids submitted to .     Hypertension  Continue home Losartan     Sinusitis  Continue home Augmentin      Morbid Obesity/BMI 45.12    The patient expressed appropriate understanding of and agreement with the discharge recommendations, medications, and plan.     Consults this admission:  IP CONSULT TO NEUROLOGY  IP CONSULT TO NEUROLOGY      Discharge

## 2024-07-28 NOTE — PROGRESS NOTES
4 Eyes Skin Assessment     NAME:  Swathi Dumont  YOB: 1995  MEDICAL RECORD NUMBER:  [id number]    The patient is being assessed for  Admission    I agree that at least one RN has performed a thorough Head to Toe Skin Assessment on the patient. ALL assessment sites listed below have been assessed.      Areas assessed by both nurses:    Head, Face, Ears, Shoulders, Back, Chest, Arms, Elbows, Hands, Sacrum. Buttock, Coccyx, Ischium, Legs. Feet and Heels, and Under Medical Devices         Does the Patient have a Wound? No noted wound(s)       Rocky Prevention initiated by RN: No  Wound Care Orders initiated by RN: No    Pressure Injury (Stage 3,4, Unstageable, DTI, NWPT, and Complex wounds) if present, place Wound referral order by RN under : No    New Ostomies, if present place, Ostomy referral order under : No     Nurse 1 eSignature: Electronically signed by Elvira Lakhani LPN on 7/26/24 at 10:30 PM EDT    **SHARE this note so that the co-signing nurse can place an eSignature**    Nurse 2 eSignature: Electronically signed by Kenney Agiular RN on 7/27/24 at 12:19 AM EDT   
Neurology Service Consult Note  [facility]   Patient Name: Swathi Dumont  : 1995        Subjective:   Reason for consult:   29 y.o. -female presenting to [facility] with Chief complaint: \" vision loss\"     Ms. Dumont is a 29 year old female with prior history of HSV, Syphilis in 2018 and  right optic neuritis. She tells me in  she was diagnosed with Left optic neuritis. She was seen in the ER for decreased vision in  given steroid eye drops. Vision deficit completely resolved. She later followed up with [facility] neurologist Noy Lawson. They wanted to start her on Ocrevus but she was trying to get pregnant and declined treatment. Her child is now 1 year old. She has yet to follow back up.     She did have an LP at that time which was negative for oligoclonal bands. MRI of brain showed a T2 hyperintensity of the left posterior periventricular white matter. Last week she states her right eye started to hurt when she would move it and progressed to blurred vision. Thursday she was seen at [facility] but ended up leaving before they could do an MRI. She states  evening she could only see shadows and presented to [facility]. She states today vision is slightly better she can see shadows, still has pain with ocular movement. She did receive 1 gram of solu medrol last night.       24    Ms. Dumont seen and examined this morning. She is sitting up in bed, states she is feeling much better. Vision is slowly getting better. She states she can actually see out the window the building and the lines on it. Yesterday she could not make out the smaller lines. She states reading her phone is better today as well. She would like to continue with the 5 days of steroid. She wants to limit lasting symptoms.      Past Medical History:   Diagnosis Date    Chlamydia     Herpes simplex type 2 (HSV-2) infection affecting pregnancy, antepartum     History of PCOS     Syphilis  
Pt must complete an MRI questionnaire before pt can have MRI performed.   
requested. Small foci of T2 signal abnormality in the left parietal white matter which are probably of no clinical significance. No convincing brain imaging findings to suggest a demyelinating process at this time. Right sphenoid sinusitis. Electronically Signed By: Hadley Valdez M.D. on 7/26/2024 9:09 AM    CT SINUS WO CONTRAST    Result Date: 7/25/2024  EXAM:  CT SINUSES WITHOUT CONTRAST, 07/25/2024 17:34 PM COMPARISON: CT ORBITS WITH CONTRAST July 25, 2024, MRI FACE (OUTSIDE IMAGE) July 19, 2024 CLINICAL INDICATIONS: 29 years Female  concern for sinusitis RELEVANT CLINICAL HISTORY: IMAGE GUIDANCE PROTOCOL 1 MM SLICES;;IMAGE GUIDANCE PROTOCOL 1 MM SLICES;;IMAGE GUIDANCE PROTOCOL 1 MM SLICES; TECHNIQUE: A series of transaxial thin section multislice computerized tomographic images are obtained through the level of the paranasal sinuses without IV contrast. Coronal, sagittal and axial reformats are provided. Conventional CT scanner was used. FINDINGS: SINUSES: Frontal sinuses: Well-developed. Clear. Frontal recesses: Patent. Ethmoid sinuses: Well-developed. Remote fracture of the left lamina papyracea. Lamina papyracea: Intact. Sphenoid sinus: Well-developed. Near-complete opacification of the right sphenoid sinus with no air-fluid level. Sphenoethmoidal recesses: Patent left sphenoethmoidal recess with opacification obscuring the right sphenoethmoidal recess. Maxillary sinuses:Well-developed. Clear. Ostiomeatal units: Patent. NASAL CAVITY: Minimal rightward nasal septal deviation. Left weston bullosa. Anterior skull base structures are intact. Visualized portions of the mastoid air cells are well-aerated. Temporomandibular joints appear within normal limits. SOFT TISSUES: Assessment of the intracranial soft tissues is suboptimal secondary to low-dose technique. No significant intracranial or facial soft tissue finding noted at the visualized levels. Full evaluation of the orbits is separately

## 2024-07-29 ENCOUNTER — TELEPHONE (OUTPATIENT)
Dept: NEUROLOGY | Age: 29
End: 2024-07-29

## 2024-07-29 ENCOUNTER — HOSPITAL ENCOUNTER (OUTPATIENT)
Dept: INFUSION THERAPY | Age: 29
Setting detail: INFUSION SERIES
Discharge: HOME | End: 2024-07-29
Payer: COMMERCIAL

## 2024-07-29 VITALS
DIASTOLIC BLOOD PRESSURE: 82 MMHG | OXYGEN SATURATION: 99 % | TEMPERATURE: 98.1 F | SYSTOLIC BLOOD PRESSURE: 151 MMHG | RESPIRATION RATE: 16 BRPM | HEART RATE: 70 BPM

## 2024-07-29 DIAGNOSIS — H46.9 OPTIC NEURITIS, LEFT: Primary | ICD-10-CM

## 2024-07-29 PROCEDURE — 6360000002 HC RX W HCPCS: Performed by: INTERNAL MEDICINE

## 2024-07-29 PROCEDURE — 2580000003 HC RX 258: Performed by: INTERNAL MEDICINE

## 2024-07-29 PROCEDURE — 96365 THER/PROPH/DIAG IV INF INIT: CPT

## 2024-07-29 RX ORDER — SODIUM CHLORIDE 0.9 % (FLUSH) 0.9 %
5-40 SYRINGE (ML) INJECTION PRN
Status: CANCELLED | OUTPATIENT
Start: 2024-07-29

## 2024-07-29 RX ORDER — SODIUM CHLORIDE 0.9 % (FLUSH) 0.9 %
5-40 SYRINGE (ML) INJECTION PRN
Status: DISCONTINUED | OUTPATIENT
Start: 2024-07-29 | End: 2024-07-30 | Stop reason: HOSPADM

## 2024-07-29 RX ORDER — SODIUM CHLORIDE 0.9 % (FLUSH) 0.9 %
5-40 SYRINGE (ML) INJECTION PRN
Status: CANCELLED | OUTPATIENT
Start: 2024-07-30

## 2024-07-29 RX ADMIN — SODIUM CHLORIDE, PRESERVATIVE FREE 10 ML: 5 INJECTION INTRAVENOUS at 11:12

## 2024-07-29 RX ADMIN — SODIUM CHLORIDE 1000 MG: 9 INJECTION, SOLUTION INTRAVENOUS at 11:14

## 2024-07-29 RX ADMIN — SODIUM CHLORIDE, PRESERVATIVE FREE 10 ML: 5 INJECTION INTRAVENOUS at 12:23

## 2024-07-29 NOTE — TELEPHONE ENCOUNTER
Patient is scheduled for follow up but she would like to know if we will be sending a steroid taper to the pharmacy for her so she does not withdraw.  Please advise.

## 2024-07-30 ENCOUNTER — HOSPITAL ENCOUNTER (OUTPATIENT)
Dept: INFUSION THERAPY | Age: 29
Setting detail: INFUSION SERIES
Discharge: HOME | End: 2024-07-30
Payer: COMMERCIAL

## 2024-07-30 VITALS
HEART RATE: 61 BPM | TEMPERATURE: 98.2 F | OXYGEN SATURATION: 98 % | DIASTOLIC BLOOD PRESSURE: 93 MMHG | SYSTOLIC BLOOD PRESSURE: 142 MMHG | RESPIRATION RATE: 16 BRPM

## 2024-07-30 DIAGNOSIS — H46.9 OPTIC NEURITIS, LEFT: Primary | ICD-10-CM

## 2024-07-30 PROCEDURE — 6360000002 HC RX W HCPCS: Performed by: INTERNAL MEDICINE

## 2024-07-30 PROCEDURE — 96365 THER/PROPH/DIAG IV INF INIT: CPT

## 2024-07-30 PROCEDURE — 2580000003 HC RX 258: Performed by: INTERNAL MEDICINE

## 2024-07-30 RX ORDER — SODIUM CHLORIDE 0.9 % (FLUSH) 0.9 %
5-40 SYRINGE (ML) INJECTION PRN
Status: CANCELLED | OUTPATIENT
Start: 2024-07-30

## 2024-07-30 RX ORDER — SODIUM CHLORIDE 0.9 % (FLUSH) 0.9 %
5-40 SYRINGE (ML) INJECTION PRN
Status: DISCONTINUED | OUTPATIENT
Start: 2024-07-30 | End: 2024-07-30

## 2024-07-30 RX ADMIN — SODIUM CHLORIDE, PRESERVATIVE FREE 10 ML: 5 INJECTION INTRAVENOUS at 12:22

## 2024-07-30 RX ADMIN — METHYLPREDNISOLONE SODIUM SUCCINATE 1000 MG: 1 INJECTION INTRAMUSCULAR; INTRAVENOUS at 11:15

## 2024-07-30 RX ADMIN — SODIUM CHLORIDE, PRESERVATIVE FREE 10 ML: 5 INJECTION INTRAVENOUS at 11:14

## 2024-07-30 NOTE — PROGRESS NOTES
Prior to discharge, the After Visit Summary Discharge Instructions were reviewed with the patient.  She was offered a printed version of the AVS, but declined the offer. Recurrent appointment, pt tolerated infusion well. Pt discharged home. Pt to exit via steady gait.    Orders Placed This Encounter   Medications    methylPREDNISolone sodium (SOLU-MEDROL) 1,000 mg in sodium chloride 0.9 % 250 mL IVPB    sodium chloride flush 0.9 % injection 5-40 mL

## 2024-07-30 NOTE — TELEPHONE ENCOUNTER
Patient called inquiring again about steroid withdrawal.  Read patient Note from Dr Greenfield, also verified patient had infusion yesterday and is at hospital now for infusion again.  Patient states concern that she was on steroids for six days once before and had withdrawal symptoms.  Patient will contact office if any issues arise.

## 2024-08-16 ENCOUNTER — OFFICE VISIT (OUTPATIENT)
Dept: NEUROLOGY | Age: 29
End: 2024-08-16
Payer: COMMERCIAL

## 2024-08-16 ENCOUNTER — HOSPITAL ENCOUNTER (OUTPATIENT)
Age: 29
Discharge: HOME | End: 2024-08-16
Payer: COMMERCIAL

## 2024-08-16 VITALS
BODY MASS INDEX: 43.6 KG/M2 | SYSTOLIC BLOOD PRESSURE: 136 MMHG | HEART RATE: 97 BPM | OXYGEN SATURATION: 98 % | DIASTOLIC BLOOD PRESSURE: 88 MMHG | WEIGHT: 262 LBS

## 2024-08-16 DIAGNOSIS — G35 RELAPSING REMITTING MULTIPLE SCLEROSIS (HCC): Primary | ICD-10-CM

## 2024-08-16 LAB
ALBUMIN SERPL-MCNC: 4.4 GM/DL (ref 3.4–5)
ALP BLD-CCNC: 72 IU/L (ref 40–129)
ALT SERPL-CCNC: 29 U/L (ref 10–40)
AST SERPL-CCNC: 16 IU/L (ref 15–37)
BASOPHILS ABSOLUTE: 0 K/CU MM
BASOPHILS RELATIVE PERCENT: 0.3 % (ref 0–1)
BILIRUB SERPL-MCNC: 0.3 MG/DL (ref 0–1)
BILIRUBIN DIRECT: 0.2 MG/DL (ref 0–0.3)
BILIRUBIN, INDIRECT: 0.1 MG/DL (ref 0–0.7)
DIFFERENTIAL TYPE: ABNORMAL
EOSINOPHILS ABSOLUTE: 0.2 K/CU MM
EOSINOPHILS RELATIVE PERCENT: 2.1 % (ref 0–3)
HBV CORE IGM SERPL QL IA: NON REACTIVE
HCT VFR BLD CALC: 41.4 % (ref 37–47)
HCV AB SERPL QL IA: NON REACTIVE
HEMOGLOBIN: 13.9 GM/DL (ref 12.5–16)
HIV 1+2 AB+HIV1P24 AG SERPLBLD IA.RAPID: NON REACTIVE
IMMATURE NEUTROPHIL %: 0.3 % (ref 0–0.43)
INTERPRETATION: NORMAL
LYMPHOCYTES ABSOLUTE: 2.7 K/CU MM
LYMPHOCYTES RELATIVE PERCENT: 30.2 % (ref 24–44)
MCH RBC QN AUTO: 32.5 PG (ref 27–31)
MCHC RBC AUTO-ENTMCNC: 33.6 % (ref 32–36)
MCV RBC AUTO: 96.7 FL (ref 78–100)
MONOCYTES ABSOLUTE: 0.5 K/CU MM
MONOCYTES RELATIVE PERCENT: 5.6 % (ref 0–4)
NEUTROPHILS ABSOLUTE: 5.6 K/CU MM
NEUTROPHILS RELATIVE PERCENT: 61.5 % (ref 36–66)
NUCLEATED RBC %: 0 %
PDW BLD-RTO: 13.3 % (ref 11.7–14.9)
PLATELET # BLD: 293 K/CU MM (ref 140–440)
PMV BLD AUTO: 9.8 FL (ref 7.5–11.1)
PREGNANCY, URINE: NEGATIVE
RBC # BLD: 4.28 M/CU MM (ref 4.2–5.4)
TOTAL IMMATURE NEUTOROPHIL: 0.03 K/CU MM
TOTAL NUCLEATED RBC: 0 K/CU MM
TOTAL PROTEIN: 7.4 GM/DL (ref 6.4–8.2)
WBC # BLD: 9.1 K/CU MM (ref 4–10.5)

## 2024-08-16 PROCEDURE — G8427 DOCREV CUR MEDS BY ELIG CLIN: HCPCS | Performed by: STUDENT IN AN ORGANIZED HEALTH CARE EDUCATION/TRAINING PROGRAM

## 2024-08-16 PROCEDURE — 85025 COMPLETE CBC W/AUTO DIFF WBC: CPT

## 2024-08-16 PROCEDURE — 36415 COLL VENOUS BLD VENIPUNCTURE: CPT

## 2024-08-16 PROCEDURE — 4004F PT TOBACCO SCREEN RCVD TLK: CPT | Performed by: STUDENT IN AN ORGANIZED HEALTH CARE EDUCATION/TRAINING PROGRAM

## 2024-08-16 PROCEDURE — 1111F DSCHRG MED/CURRENT MED MERGE: CPT | Performed by: STUDENT IN AN ORGANIZED HEALTH CARE EDUCATION/TRAINING PROGRAM

## 2024-08-16 PROCEDURE — 80076 HEPATIC FUNCTION PANEL: CPT

## 2024-08-16 PROCEDURE — G8417 CALC BMI ABV UP PARAM F/U: HCPCS | Performed by: STUDENT IN AN ORGANIZED HEALTH CARE EDUCATION/TRAINING PROGRAM

## 2024-08-16 PROCEDURE — 87389 HIV-1 AG W/HIV-1&-2 AB AG IA: CPT

## 2024-08-16 PROCEDURE — 86787 VARICELLA-ZOSTER ANTIBODY: CPT

## 2024-08-16 PROCEDURE — 99215 OFFICE O/P EST HI 40 MIN: CPT | Performed by: STUDENT IN AN ORGANIZED HEALTH CARE EDUCATION/TRAINING PROGRAM

## 2024-08-16 PROCEDURE — 86803 HEPATITIS C AB TEST: CPT

## 2024-08-16 PROCEDURE — 86705 HEP B CORE ANTIBODY IGM: CPT

## 2024-08-16 PROCEDURE — 81025 URINE PREGNANCY TEST: CPT

## 2024-08-16 NOTE — PROGRESS NOTES
parietal white matter lesion.  Her history is complicated however by history of HSV and syphilis, with consideration for syphilitic optic neuritis, currently following with neuro-ophthalmology.  We did discuss that she may have to have LP repeated unfortunately, pending her upcoming appointment with neuro-ophthalmology, to rule in or out syphilis in the CSF (again), and to obtain oligoclonal bands (which were not assessed on 2021 LP).  Marco Antonio does not want to proceed with LP today.    We again discussed DMT options today and she continues to have greatest interest in Mavenclad.  I will obtain the prestart lab work today with CBC with differential, LFT, hep B, hep C, HIV, urine pregnancy, varicella antibody.  She had recent negative TB.  We discussed that she may need to go on prophylactic treatment for herpes throughout treatment with Mavenclad if she does opt to pursue this, and will need to follow closely with her PCP and OB/GYN for skin exam, breast exam, GYN exams.  She does want to try and have a second child, and was agreeable to waiting around 18 months after starting Mavenclad if she does start, and to maintain very strict contraceptive measures until then.  Neurodiagnostics as above      We will plan to see the patient back in approximately 6 weeks, with me.     > 60 min were spent on this encounter including chart review, reviewing prior imaging and lab work, in interview and exam of patient, and in documentation.    (Please note that portions of this note were completed with a voice recognition program.  Efforts were made to edit the dictations but occasionally words are mistranscribed.)      Maritza Greenfield DO, 8/16/2024

## 2024-08-18 LAB — VZV IGG SER IA-ACNC: 339.1 IV

## 2024-08-26 ENCOUNTER — HOSPITAL ENCOUNTER (OUTPATIENT)
Age: 29
Discharge: HOME | End: 2024-08-26
Payer: COMMERCIAL

## 2024-08-26 DIAGNOSIS — G35 RELAPSING REMITTING MULTIPLE SCLEROSIS (HCC): ICD-10-CM

## 2024-08-26 LAB
ALBUMIN SERPL-MCNC: 4.5 GM/DL (ref 3.4–5)
ALP BLD-CCNC: 80 IU/L (ref 40–129)
ALT SERPL-CCNC: 33 U/L (ref 10–40)
APTT: 35.3 SECONDS (ref 25.1–37.1)
AST SERPL-CCNC: 18 IU/L (ref 15–37)
BASOPHILS ABSOLUTE: 0 K/CU MM
BASOPHILS RELATIVE PERCENT: 0.4 % (ref 0–1)
BILIRUB SERPL-MCNC: 0.2 MG/DL (ref 0–1)
BILIRUBIN DIRECT: 0.2 MG/DL (ref 0–0.3)
BILIRUBIN, INDIRECT: 0 MG/DL (ref 0–0.7)
DIFFERENTIAL TYPE: ABNORMAL
EOSINOPHILS ABSOLUTE: 0.2 K/CU MM
EOSINOPHILS RELATIVE PERCENT: 2.3 % (ref 0–3)
HCT VFR BLD CALC: 45.1 % (ref 37–47)
HEMOGLOBIN: 14.7 GM/DL (ref 12.5–16)
IMMATURE NEUTROPHIL %: 0.7 % (ref 0–0.43)
INR BLD: 0.9 INDEX
INTERPRETATION: NORMAL
LYMPHOCYTES ABSOLUTE: 3.9 K/CU MM
LYMPHOCYTES RELATIVE PERCENT: 37.3 % (ref 24–44)
MCH RBC QN AUTO: 32.2 PG (ref 27–31)
MCHC RBC AUTO-ENTMCNC: 32.6 % (ref 32–36)
MCV RBC AUTO: 98.7 FL (ref 78–100)
MONOCYTES ABSOLUTE: 0.7 K/CU MM
MONOCYTES RELATIVE PERCENT: 6.9 % (ref 0–4)
NEUTROPHILS ABSOLUTE: 5.4 K/CU MM
NEUTROPHILS RELATIVE PERCENT: 52.4 % (ref 36–66)
NUCLEATED RBC %: 0 %
PDW BLD-RTO: 13.2 % (ref 11.7–14.9)
PLATELET # BLD: 356 K/CU MM (ref 140–440)
PMV BLD AUTO: 9.9 FL (ref 7.5–11.1)
PREGNANCY, URINE: NEGATIVE
PROTHROMBIN TIME: 12.9 SECONDS (ref 11.7–14.5)
RBC # BLD: 4.57 M/CU MM (ref 4.2–5.4)
TOTAL IMMATURE NEUTOROPHIL: 0.07 K/CU MM
TOTAL NUCLEATED RBC: 0 K/CU MM
TOTAL PROTEIN: 7.2 GM/DL (ref 6.4–8.2)
WBC # BLD: 10.3 K/CU MM (ref 4–10.5)

## 2024-08-26 PROCEDURE — 36415 COLL VENOUS BLD VENIPUNCTURE: CPT

## 2024-08-26 PROCEDURE — 87389 HIV-1 AG W/HIV-1&-2 AB AG IA: CPT

## 2024-08-26 PROCEDURE — 85025 COMPLETE CBC W/AUTO DIFF WBC: CPT

## 2024-08-26 PROCEDURE — 86705 HEP B CORE ANTIBODY IGM: CPT

## 2024-08-26 PROCEDURE — 85610 PROTHROMBIN TIME: CPT

## 2024-08-26 PROCEDURE — 80076 HEPATIC FUNCTION PANEL: CPT

## 2024-08-26 PROCEDURE — 81025 URINE PREGNANCY TEST: CPT

## 2024-08-26 PROCEDURE — 85730 THROMBOPLASTIN TIME PARTIAL: CPT

## 2024-08-26 PROCEDURE — 86803 HEPATITIS C AB TEST: CPT

## 2024-08-26 PROCEDURE — 86706 HEP B SURFACE ANTIBODY: CPT

## 2024-08-26 PROCEDURE — 86787 VARICELLA-ZOSTER ANTIBODY: CPT

## 2024-08-27 LAB
HBV CORE IGM SERPL QL IA: NON REACTIVE
HBV SURFACE AB SERPL IA-ACNC: 18.41 M[IU]/ML
HCV AB SERPL QL IA: NON REACTIVE
HIV 1+2 AB+HIV1P24 AG SERPLBLD IA.RAPID: NON REACTIVE

## 2024-08-29 LAB — VZV IGG SER IA-ACNC: 430 IV

## 2024-10-07 ENCOUNTER — OFFICE VISIT (OUTPATIENT)
Dept: NEUROLOGY | Age: 29
End: 2024-10-07
Payer: COMMERCIAL

## 2024-10-07 VITALS
DIASTOLIC BLOOD PRESSURE: 80 MMHG | WEIGHT: 258 LBS | OXYGEN SATURATION: 99 % | SYSTOLIC BLOOD PRESSURE: 132 MMHG | HEIGHT: 65 IN | HEART RATE: 81 BPM | BODY MASS INDEX: 42.99 KG/M2

## 2024-10-07 DIAGNOSIS — H46.9 RECURRENT OPTIC NEURITIS: Primary | ICD-10-CM

## 2024-10-07 PROCEDURE — 4004F PT TOBACCO SCREEN RCVD TLK: CPT | Performed by: STUDENT IN AN ORGANIZED HEALTH CARE EDUCATION/TRAINING PROGRAM

## 2024-10-07 PROCEDURE — G8417 CALC BMI ABV UP PARAM F/U: HCPCS | Performed by: STUDENT IN AN ORGANIZED HEALTH CARE EDUCATION/TRAINING PROGRAM

## 2024-10-07 PROCEDURE — G8484 FLU IMMUNIZE NO ADMIN: HCPCS | Performed by: STUDENT IN AN ORGANIZED HEALTH CARE EDUCATION/TRAINING PROGRAM

## 2024-10-07 PROCEDURE — 99215 OFFICE O/P EST HI 40 MIN: CPT | Performed by: STUDENT IN AN ORGANIZED HEALTH CARE EDUCATION/TRAINING PROGRAM

## 2024-10-07 PROCEDURE — G8427 DOCREV CUR MEDS BY ELIG CLIN: HCPCS | Performed by: STUDENT IN AN ORGANIZED HEALTH CARE EDUCATION/TRAINING PROGRAM

## 2024-10-07 RX ORDER — PREDNISONE 50 MG/1
50 TABLET ORAL EVERY 6 HOURS
Qty: 3 TABLET | Refills: 0 | Status: SHIPPED | OUTPATIENT
Start: 2024-12-30 | End: 2024-12-31

## 2024-10-07 RX ORDER — DIPHENHYDRAMINE HCL 25 MG
50 CAPSULE ORAL ONCE
Qty: 2 CAPSULE | Refills: 0 | Status: SHIPPED | OUTPATIENT
Start: 2024-12-30 | End: 2024-12-30

## 2024-10-07 NOTE — PROGRESS NOTES
Tremors--none      Finger-to-Nose: no dysmetria b/l    Gait and stance:      Gait: deferred      LABS:        CBC: No results for input(s): \"WBC\", \"RBC\", \"HGB\", \"HCT\", \"PLT\", \"MCV\" in the last 72 hours.  BMP:  No results for input(s): \"NA\", \"K\", \"CL\", \"CO2\", \"BUN\", \"CREATININE\", \"GLUCOSE\", \"CALCIUM\" in the last 72 hours.    IMAGING:    MRI Orbits face neck 7/26/2024:   Increased right intraocular optic nerve segments intensity and enhancement as  described above, concerning for acute inflammatory optic neuritis.  Small scattered periventricular subcortical white matter lesions, concerning for  demyelinating disease.    MRI cervical spine with and without contrast 7/27/2024  IMPRESSION:  No demyelinating plaques identified in the cord. There are no  significant degenerative changes.    MRI thoracic spine without contrast 3/16/2021  IMPRESSION:  1. Patient motion partially limits evaluation.  2. No convincing abnormal cord signal.  3. No significant spinal canal stenosis or neural foraminal narrowing.    MRI cervical spine without contrast 3/16/2021  IMPRESSION:  1. No convincing abnormal cord signal is seen of the cervical spine.  2. No significant spinal canal stenosis or neural foraminal narrowing.  3. Straightening of the normal cervical lordosis without spondylolisthesis.    MRI orbits with and without contrast 3/10/2021  IMPRESSION:  1. Findings compatible with left optic neuritis.  2. Focal white matter lesion in the posterior left periventricular white  matter, compatible with demyelinating disease given the patient's clinical  history.  No areas of abnormal enhancement intracranially.    MRI brain and orbits 3/10/2021  MRI ORBITS:  There is an old left lamina papyracea fracture.  The globes are normal in  appearance.  There is enhancement and edema along the left optic nerve sheath  complex, compatible with optic neuritis.  The extraocular muscles are  unremarkable.  The enhancement extends along the left

## 2025-04-05 ENCOUNTER — HOSPITAL ENCOUNTER (EMERGENCY)
Age: 30
Discharge: HOME | End: 2025-04-05
Attending: EMERGENCY MEDICINE
Payer: COMMERCIAL

## 2025-04-05 ENCOUNTER — APPOINTMENT (OUTPATIENT)
Dept: CT IMAGING | Age: 30
End: 2025-04-05
Payer: COMMERCIAL

## 2025-04-05 VITALS
BODY MASS INDEX: 41.65 KG/M2 | OXYGEN SATURATION: 99 % | RESPIRATION RATE: 16 BRPM | DIASTOLIC BLOOD PRESSURE: 76 MMHG | SYSTOLIC BLOOD PRESSURE: 132 MMHG | HEIGHT: 65 IN | HEART RATE: 58 BPM | TEMPERATURE: 97.7 F | WEIGHT: 250 LBS

## 2025-04-05 DIAGNOSIS — R11.2 NAUSEA AND VOMITING, UNSPECIFIED VOMITING TYPE: Primary | ICD-10-CM

## 2025-04-05 DIAGNOSIS — R10.13 ABDOMINAL PAIN, EPIGASTRIC: ICD-10-CM

## 2025-04-05 LAB
ALBUMIN SERPL-MCNC: 4.3 G/DL (ref 3.4–5)
ALBUMIN/GLOB SERPL: 1.7 {RATIO}
ALP SERPL-CCNC: 65 U/L (ref 40–129)
ALT SERPL-CCNC: 20 U/L (ref 10–40)
ANION GAP SERPL CALCULATED.3IONS-SCNC: 18 MMOL/L (ref 9–17)
AST SERPL-CCNC: 16 U/L (ref 15–37)
B-HCG SERPL EIA 3RD IS-ACNC: <1 MIU/ML
BASOPHILS # BLD: 0.03 K/UL
BASOPHILS NFR BLD: 0 % (ref 0–1)
BILIRUB SERPL-MCNC: 0.2 MG/DL (ref 0–1)
BUN SERPL-MCNC: 14 MG/DL (ref 7–20)
CALCIUM SERPL-MCNC: 9.3 MG/DL (ref 8.3–10.6)
CHLORIDE SERPL-SCNC: 102 MMOL/L (ref 99–110)
CO2 SERPL-SCNC: 19 MMOL/L (ref 21–32)
CREAT SERPL-MCNC: 0.8 MG/DL (ref 0.6–1.1)
EOSINOPHIL # BLD: 0.17 K/UL
EOSINOPHILS RELATIVE PERCENT: 2 % (ref 0–3)
ERYTHROCYTE [DISTWIDTH] IN BLOOD BY AUTOMATED COUNT: 13.7 % (ref 11.7–14.9)
GFR, ESTIMATED: >90 ML/MIN/1.73M2
GLUCOSE SERPL-MCNC: 132 MG/DL (ref 74–99)
HCT VFR BLD AUTO: 43.4 % (ref 37–47)
HGB BLD-MCNC: 14.6 G/DL (ref 12.5–16)
IMM GRANULOCYTES # BLD AUTO: 0.05 K/UL
IMM GRANULOCYTES NFR BLD: 1 %
LIPASE SERPL-CCNC: 21 U/L (ref 13–60)
LYMPHOCYTES NFR BLD: 2.06 K/UL
LYMPHOCYTES RELATIVE PERCENT: 19 % (ref 24–44)
MCH RBC QN AUTO: 32.2 PG (ref 27–31)
MCHC RBC AUTO-ENTMCNC: 33.6 G/DL (ref 32–36)
MCV RBC AUTO: 95.8 FL (ref 78–100)
MONOCYTES NFR BLD: 0.59 K/UL
MONOCYTES NFR BLD: 5 % (ref 0–4)
NEUTROPHILS NFR BLD: 73 % (ref 36–66)
NEUTS SEG NFR BLD: 7.94 K/UL
PLATELET # BLD AUTO: 286 K/UL (ref 140–440)
PMV BLD AUTO: 10.1 FL (ref 7.5–11.1)
POTASSIUM SERPL-SCNC: 3.8 MMOL/L (ref 3.5–5.1)
PROT SERPL-MCNC: 6.8 G/DL (ref 6.4–8.2)
RBC # BLD AUTO: 4.53 M/UL (ref 4.2–5.4)
SODIUM SERPL-SCNC: 139 MMOL/L (ref 136–145)
WBC OTHER # BLD: 10.8 K/UL (ref 4–10.5)

## 2025-04-05 PROCEDURE — 74176 CT ABD & PELVIS W/O CONTRAST: CPT

## 2025-04-05 PROCEDURE — 85025 COMPLETE CBC W/AUTO DIFF WBC: CPT

## 2025-04-05 PROCEDURE — 96361 HYDRATE IV INFUSION ADD-ON: CPT

## 2025-04-05 PROCEDURE — 84702 CHORIONIC GONADOTROPIN TEST: CPT

## 2025-04-05 PROCEDURE — 96375 TX/PRO/DX INJ NEW DRUG ADDON: CPT

## 2025-04-05 PROCEDURE — 96374 THER/PROPH/DIAG INJ IV PUSH: CPT

## 2025-04-05 PROCEDURE — 80053 COMPREHEN METABOLIC PANEL: CPT

## 2025-04-05 PROCEDURE — 6360000002 HC RX W HCPCS: Performed by: EMERGENCY MEDICINE

## 2025-04-05 PROCEDURE — 99284 EMERGENCY DEPT VISIT MOD MDM: CPT

## 2025-04-05 PROCEDURE — 2580000003 HC RX 258: Performed by: EMERGENCY MEDICINE

## 2025-04-05 PROCEDURE — 83690 ASSAY OF LIPASE: CPT

## 2025-04-05 RX ORDER — PROCHLORPERAZINE EDISYLATE 5 MG/ML
10 INJECTION INTRAMUSCULAR; INTRAVENOUS ONCE
Status: COMPLETED | OUTPATIENT
Start: 2025-04-05 | End: 2025-04-05

## 2025-04-05 RX ORDER — MORPHINE SULFATE 4 MG/ML
4 INJECTION, SOLUTION INTRAMUSCULAR; INTRAVENOUS EVERY 30 MIN PRN
Refills: 0 | Status: DISCONTINUED | OUTPATIENT
Start: 2025-04-05 | End: 2025-04-05 | Stop reason: HOSPADM

## 2025-04-05 RX ORDER — DIPHENHYDRAMINE HYDROCHLORIDE 50 MG/ML
50 INJECTION, SOLUTION INTRAMUSCULAR; INTRAVENOUS ONCE
Status: COMPLETED | OUTPATIENT
Start: 2025-04-05 | End: 2025-04-05

## 2025-04-05 RX ORDER — 0.9 % SODIUM CHLORIDE 0.9 %
1000 INTRAVENOUS SOLUTION INTRAVENOUS ONCE
Status: COMPLETED | OUTPATIENT
Start: 2025-04-05 | End: 2025-04-05

## 2025-04-05 RX ORDER — PROMETHAZINE HYDROCHLORIDE 25 MG/1
25 TABLET ORAL EVERY 6 HOURS PRN
Qty: 20 TABLET | Refills: 0 | Status: SHIPPED | OUTPATIENT
Start: 2025-04-05 | End: 2025-04-12

## 2025-04-05 RX ORDER — ONDANSETRON 4 MG/1
4 TABLET, ORALLY DISINTEGRATING ORAL 3 TIMES DAILY PRN
Qty: 21 TABLET | Refills: 0 | Status: SHIPPED | OUTPATIENT
Start: 2025-04-05

## 2025-04-05 RX ORDER — ONDANSETRON 2 MG/ML
4 INJECTION INTRAMUSCULAR; INTRAVENOUS EVERY 30 MIN PRN
Status: DISCONTINUED | OUTPATIENT
Start: 2025-04-05 | End: 2025-04-05 | Stop reason: HOSPADM

## 2025-04-05 RX ORDER — HALOPERIDOL 5 MG/ML
2 INJECTION INTRAMUSCULAR ONCE
Status: COMPLETED | OUTPATIENT
Start: 2025-04-05 | End: 2025-04-05

## 2025-04-05 RX ADMIN — ONDANSETRON 4 MG: 2 INJECTION, SOLUTION INTRAMUSCULAR; INTRAVENOUS at 02:57

## 2025-04-05 RX ADMIN — PROCHLORPERAZINE EDISYLATE 10 MG: 5 INJECTION INTRAMUSCULAR; INTRAVENOUS at 04:33

## 2025-04-05 RX ADMIN — HALOPERIDOL LACTATE 2 MG: 5 INJECTION, SOLUTION INTRAMUSCULAR at 05:54

## 2025-04-05 RX ADMIN — DIPHENHYDRAMINE HYDROCHLORIDE 50 MG: 50 INJECTION INTRAMUSCULAR; INTRAVENOUS at 05:54

## 2025-04-05 RX ADMIN — SODIUM CHLORIDE 1000 ML: 0.9 INJECTION, SOLUTION INTRAVENOUS at 03:03

## 2025-04-05 RX ADMIN — MORPHINE SULFATE 4 MG: 4 INJECTION INTRAVENOUS at 02:57

## 2025-04-05 ASSESSMENT — PAIN SCALES - GENERAL
PAINLEVEL_OUTOF10: 6
PAINLEVEL_OUTOF10: 7
PAINLEVEL_OUTOF10: 0

## 2025-04-05 ASSESSMENT — PAIN DESCRIPTION - LOCATION
LOCATION: ABDOMEN
LOCATION: ABDOMEN

## 2025-04-05 ASSESSMENT — PAIN DESCRIPTION - FREQUENCY: FREQUENCY: CONTINUOUS

## 2025-04-05 ASSESSMENT — PAIN DESCRIPTION - DIRECTION: RADIATING_TOWARDS: BACK

## 2025-04-05 ASSESSMENT — PAIN DESCRIPTION - DESCRIPTORS
DESCRIPTORS: ACHING;DULL
DESCRIPTORS: ACHING;DULL

## 2025-04-05 ASSESSMENT — LIFESTYLE VARIABLES
HOW OFTEN DO YOU HAVE A DRINK CONTAINING ALCOHOL: NEVER
HOW MANY STANDARD DRINKS CONTAINING ALCOHOL DO YOU HAVE ON A TYPICAL DAY: PATIENT DOES NOT DRINK

## 2025-04-05 ASSESSMENT — PAIN DESCRIPTION - PAIN TYPE: TYPE: ACUTE PAIN

## 2025-04-05 ASSESSMENT — PAIN DESCRIPTION - ORIENTATION
ORIENTATION: MID
ORIENTATION: MID

## 2025-04-05 ASSESSMENT — PAIN - FUNCTIONAL ASSESSMENT
PAIN_FUNCTIONAL_ASSESSMENT: ACTIVITIES ARE NOT PREVENTED
PAIN_FUNCTIONAL_ASSESSMENT: NONE - DENIES PAIN
PAIN_FUNCTIONAL_ASSESSMENT: 0-10

## 2025-04-05 NOTE — DISCHARGE INSTRUCTIONS
Use medications as needed.  Consider decreasing your intake of cannabis.  Return to the ER for worsening signs and symptoms.  Follow-up with your primary caregiver for recheck soon as possible.

## 2025-04-05 NOTE — ED TRIAGE NOTES
Patient states she started having abdominal pain on Friday morning. Has vomited 4 times in the last 45 min. Pain radiates to her back. Has not been able to have a bm

## 2025-04-05 NOTE — ED PROVIDER NOTES
pelvic process. 2. Fatty infiltration of the liver.  Dictated and Electronically Signed By: Alex Farmer MD 4/5/2025 4:46          ED Course and MDM:  This patient presents to the emergency department with nausea vomiting and epigastric abdominal pain that started last night.  She states that she does smoke a fair amount of marijuana.  She has not had a problem like this previously.  CC/HPI Summary, DDx, ED Course, and Reassessment: Workup here in the emergency department reveals a normal CT scan.  Her laboratory workup is unremarkable.  Patient was given Zofran.  She states that she was still feeling nauseated after having that medication along with morphine.  She was then given Compazine and another dose of Zofran she states that did not seem to help much.  She was then given some Benadryl and some Haldol.  She is currently resting comfortably.  I think she can be safely discharged home with prescription for Phenergan and Zofran.    History from : Patient    Limitations to history : None    Patient was given the following medications:  Medications   ondansetron (ZOFRAN) injection 4 mg (4 mg IntraVENous Given 4/5/25 0257)   morphine sulfate (PF) injection 4 mg (4 mg IntraVENous Given 4/5/25 0257)   sodium chloride 0.9 % bolus 1,000 mL (0 mLs IntraVENous Stopped 4/5/25 9446)   prochlorperazine (COMPAZINE) injection 10 mg (10 mg IntraVENous Given 4/5/25 1293)   diphenhydrAMINE (BENADRYL) injection 50 mg (50 mg IntraVENous Given 4/5/25 3854)   haloperidol lactate (HALDOL) injection 2 mg (2 mg IntraVENous Given 4/5/25 2254)       Independent Imaging Interpretation by me: None    EKG (if obtained): (All EKG's are interpreted by myself in the absence of a cardiologist) not ordered    Chronic conditions affecting care: None    Discussion with Other Profesionals : None    Social Determinants : None    Records Reviewed : None    Disposition Considerations (tests considered but not done, Shared Decision Making, Pt

## 2025-06-09 ENCOUNTER — TELEPHONE (OUTPATIENT)
Age: 30
End: 2025-06-09

## 2025-06-09 NOTE — TELEPHONE ENCOUNTER
Patient called to check if she has any MRI orders to be done before her next appointment on 06/25/2025 with Dr Greenfield, per chart, provider ordered 3 MRIs. Patient understood and will call to schedule with Central Scheduling.

## 2025-06-10 NOTE — TELEPHONE ENCOUNTER
Patient requires [facility] Prep orders before imaging can be performed due to dye allergy.  Patient scheduled to follow up with you on 6/25.

## 2025-06-26 ENCOUNTER — HOSPITAL ENCOUNTER (OUTPATIENT)
Dept: MRI IMAGING | Age: 30
Discharge: HOME | End: 2025-06-26
Attending: STUDENT IN AN ORGANIZED HEALTH CARE EDUCATION/TRAINING PROGRAM
Payer: COMMERCIAL

## 2025-06-26 DIAGNOSIS — H46.9 RECURRENT OPTIC NEURITIS: ICD-10-CM

## 2025-06-26 PROCEDURE — 70553 MRI BRAIN STEM W/O & W/DYE: CPT

## 2025-06-26 PROCEDURE — 72157 MRI CHEST SPINE W/O & W/DYE: CPT

## 2025-06-26 PROCEDURE — A9579 GAD-BASE MR CONTRAST NOS,1ML: HCPCS | Performed by: STUDENT IN AN ORGANIZED HEALTH CARE EDUCATION/TRAINING PROGRAM

## 2025-06-26 PROCEDURE — 6360000004 HC RX CONTRAST MEDICATION: Performed by: STUDENT IN AN ORGANIZED HEALTH CARE EDUCATION/TRAINING PROGRAM

## 2025-06-26 PROCEDURE — 72156 MRI NECK SPINE W/O & W/DYE: CPT

## 2025-06-26 RX ORDER — GADOTERIDOL 279.3 MG/ML
10 INJECTION INTRAVENOUS
Status: COMPLETED | OUTPATIENT
Start: 2025-06-26 | End: 2025-06-26

## 2025-06-26 RX ADMIN — GADOTERIDOL 10 ML: 279.3 INJECTION, SOLUTION INTRAVENOUS at 11:02

## 2025-07-22 ENCOUNTER — OFFICE VISIT (OUTPATIENT)
Age: 30
End: 2025-07-22
Payer: COMMERCIAL

## 2025-07-22 VITALS
SYSTOLIC BLOOD PRESSURE: 134 MMHG | DIASTOLIC BLOOD PRESSURE: 82 MMHG | OXYGEN SATURATION: 97 % | HEART RATE: 79 BPM | WEIGHT: 251.8 LBS | HEIGHT: 65 IN | BODY MASS INDEX: 41.95 KG/M2

## 2025-07-22 DIAGNOSIS — G35 RELAPSING REMITTING MULTIPLE SCLEROSIS (HCC): Primary | ICD-10-CM

## 2025-07-22 PROCEDURE — G2211 COMPLEX E/M VISIT ADD ON: HCPCS | Performed by: STUDENT IN AN ORGANIZED HEALTH CARE EDUCATION/TRAINING PROGRAM

## 2025-07-22 PROCEDURE — 4004F PT TOBACCO SCREEN RCVD TLK: CPT | Performed by: STUDENT IN AN ORGANIZED HEALTH CARE EDUCATION/TRAINING PROGRAM

## 2025-07-22 PROCEDURE — 99215 OFFICE O/P EST HI 40 MIN: CPT | Performed by: STUDENT IN AN ORGANIZED HEALTH CARE EDUCATION/TRAINING PROGRAM

## 2025-07-22 PROCEDURE — G8417 CALC BMI ABV UP PARAM F/U: HCPCS | Performed by: STUDENT IN AN ORGANIZED HEALTH CARE EDUCATION/TRAINING PROGRAM

## 2025-07-22 PROCEDURE — G8427 DOCREV CUR MEDS BY ELIG CLIN: HCPCS | Performed by: STUDENT IN AN ORGANIZED HEALTH CARE EDUCATION/TRAINING PROGRAM

## 2025-07-22 NOTE — PROGRESS NOTES
extraocular muscles are  unremarkable.  The enhancement extends along the left optic canal.  The optic  chiasm is unremarkable.  Infundibulum is midline.  The cavernous sinuses are  unremarkable.     IMPRESSION:  1. Findings compatible with left optic neuritis.  2. Focal white matter lesion in the posterior left periventricular white  matter, compatible with demyelinating disease given the patient's clinical  history.  No areas of abnormal enhancement intracranially.    CSF 3/16/2021  Glucose 86 (no serum to compare to), protein WNL at 23  CSF VDRL nonreactive  CSF RBCs 0 and 1  CSF WBCs 2 and 4  CSF HSV PCR nonreactive  CSF West Nile IgM negative  Oligoclonal bands: Were not assessed    Infectious lab history:  JCV antibody April 2021: Negative (0.22)  Hep B surface +2024  Hep C - 2024  Varicella immune  Treponema pallidum antibody + 2021     HARLAN (negative), ANCA (negative), ACE (WNL), Lyme (negative), TB (negative), MOG (negative), NMO (negative), and syphillis IgG repeat (IgG positive, pending reflex to RPR).     Above images and reports personally reviewed in detail.   ASSESSMENT/PLAN:       ICD-10-CM    1. Relapsing remitting multiple sclerosis (HCC)  G35 CBC with Auto Differential     Hepatic Function Panel     TSH reflex to FT4               30 y.o. -female with PMH clinically suspected optic neuritis 2014, confirmed left eye optic neuritis March 2021, confirmed right eye optic neuritis 2024, genital HSV, syphillis (Treated 2018. negative in CSF 2021 during initial optic neuritis) presenting to [facility] in follow-up of recurrent optic neuritis with cord plaque later identified and diagnosis now made of RRMS.     We discussed DMT options today and possible side effects.  Given her complex history of HSV and syphilis with more significant risk of resurgence of those latent infections on various DMT's, further complicated by the fact that she has recently started trying to conceive and is not